# Patient Record
Sex: OTHER/UNKNOWN | Race: WHITE | NOT HISPANIC OR LATINO | Employment: OTHER | URBAN - METROPOLITAN AREA
[De-identification: names, ages, dates, MRNs, and addresses within clinical notes are randomized per-mention and may not be internally consistent; named-entity substitution may affect disease eponyms.]

---

## 2017-08-21 PROBLEM — Z86.011 HISTORY OF MENINGIOMA OF THE BRAIN: Status: ACTIVE | Noted: 2017-08-21

## 2017-08-21 PROBLEM — R27.8 IMPAIRED COORDINATION OF UPPER EXTREMITY: Status: ACTIVE | Noted: 2017-08-21

## 2017-08-21 PROBLEM — Z86.73 HISTORY OF CVA (CEREBROVASCULAR ACCIDENT): Status: ACTIVE | Noted: 2017-08-21

## 2017-08-21 PROBLEM — Q21.9 SEPTAL DEFECT, HEART: Status: ACTIVE | Noted: 2017-08-21

## 2017-08-21 PROBLEM — K21.9 GERD (GASTROESOPHAGEAL REFLUX DISEASE): Status: ACTIVE | Noted: 2017-08-21

## 2017-08-21 PROBLEM — E03.9 HYPOTHYROIDISM: Status: ACTIVE | Noted: 2017-08-21

## 2018-05-14 PROBLEM — G89.29 CHRONIC BILATERAL LOW BACK PAIN WITHOUT SCIATICA: Status: ACTIVE | Noted: 2018-05-14

## 2018-05-14 PROBLEM — M54.50 CHRONIC BILATERAL LOW BACK PAIN WITHOUT SCIATICA: Status: ACTIVE | Noted: 2018-05-14

## 2018-07-27 PROBLEM — F41.8 OTHER SPECIFIED ANXIETY DISORDERS: Status: ACTIVE | Noted: 2018-07-27

## 2019-02-18 PROBLEM — F32.A DEPRESSION: Status: ACTIVE | Noted: 2019-02-18

## 2019-02-18 PROBLEM — Z00.00 MEDICARE ANNUAL WELLNESS VISIT, SUBSEQUENT: Status: ACTIVE | Noted: 2019-02-18

## 2019-05-15 PROBLEM — G47.09 OTHER INSOMNIA: Status: ACTIVE | Noted: 2019-05-15

## 2019-11-04 PROBLEM — I10 ESSENTIAL HYPERTENSION: Status: ACTIVE | Noted: 2019-11-04

## 2020-03-06 PROBLEM — D32.9 BENIGN NEOPLASM OF MENINGES (HCC): Status: ACTIVE | Noted: 2020-03-06

## 2020-03-06 PROBLEM — E55.9 VITAMIN D DEFICIENCY: Status: ACTIVE | Noted: 2020-03-06

## 2020-03-06 PROBLEM — I77.9 DISORDER OF ARTERY OR ARTERIOLE (HCC): Status: ACTIVE | Noted: 2020-03-06

## 2020-03-08 PROBLEM — R32 URINARY INCONTINENCE: Status: ACTIVE | Noted: 2020-03-08

## 2020-03-08 PROBLEM — F41.8 DEPRESSION WITH ANXIETY: Status: ACTIVE | Noted: 2019-02-18

## 2020-03-08 PROBLEM — H54.7 VISION IMPAIRMENT: Status: ACTIVE | Noted: 2020-03-08

## 2020-03-27 PROBLEM — K21.9 GERD (GASTROESOPHAGEAL REFLUX DISEASE): Chronic | Status: ACTIVE | Noted: 2017-08-21

## 2020-03-27 PROBLEM — F41.8 DEPRESSION WITH ANXIETY: Chronic | Status: ACTIVE | Noted: 2019-02-18

## 2020-04-24 PROBLEM — L65.9 HAIR LOSS: Status: ACTIVE | Noted: 2020-04-24

## 2020-05-31 PROBLEM — I95.1 ORTHOSTATIC HYPOTENSION: Status: ACTIVE | Noted: 2020-05-31

## 2020-08-04 PROBLEM — Z87.42 HISTORY OF POSTMENOPAUSAL BLEEDING: Status: ACTIVE | Noted: 2020-08-04

## 2020-08-04 PROBLEM — Z12.39 SCREENING FOR BREAST CANCER: Status: ACTIVE | Noted: 2020-08-04

## 2020-08-04 PROBLEM — Z13.820 SCREENING FOR OSTEOPOROSIS: Status: ACTIVE | Noted: 2020-08-04

## 2020-08-04 PROBLEM — Z96.0 PRESENCE OF PESSARY: Status: ACTIVE | Noted: 2020-08-04

## 2020-08-04 PROBLEM — L98.9 SKIN LESION: Status: ACTIVE | Noted: 2020-08-04

## 2020-09-04 PROBLEM — H35.30 MACULAR DEGENERATION: Status: ACTIVE | Noted: 2020-09-04

## 2020-09-04 PROBLEM — Z23 IMMUNIZATION DUE: Status: ACTIVE | Noted: 2020-09-04

## 2020-09-17 PROBLEM — Z12.83 SKIN EXAM, SCREENING FOR CANCER: Status: ACTIVE | Noted: 2020-09-17

## 2020-09-17 PROBLEM — M85.80 OSTEOPENIA: Status: ACTIVE | Noted: 2020-09-17

## 2020-09-17 PROBLEM — R27.8 IMPAIRED COORDINATION OF UPPER EXTREMITY: Status: RESOLVED | Noted: 2017-08-21 | Resolved: 2020-09-17

## 2020-11-03 PROBLEM — G47.09 OTHER INSOMNIA: Chronic | Status: ACTIVE | Noted: 2019-05-15

## 2020-11-03 PROBLEM — I77.89 ECTASIA OF ARTERY (HCC): Status: ACTIVE | Noted: 2017-08-03

## 2020-11-03 PROBLEM — F33.1 MAJOR DEPRESSIVE DISORDER, RECURRENT EPISODE, MODERATE (HCC): Chronic | Status: ACTIVE | Noted: 2019-02-18

## 2020-11-03 PROBLEM — F41.1 GAD (GENERALIZED ANXIETY DISORDER): Chronic | Status: ACTIVE | Noted: 2020-11-03

## 2020-11-05 PROBLEM — H91.93 DECREASED HEARING OF BOTH EARS: Status: ACTIVE | Noted: 2020-11-05

## 2021-03-22 PROBLEM — R10.32 LEFT GROIN PAIN: Status: ACTIVE | Noted: 2021-03-22

## 2021-03-26 PROBLEM — M70.62 GREATER TROCHANTERIC BURSITIS OF LEFT HIP: Status: ACTIVE | Noted: 2021-03-26

## 2021-04-23 PROBLEM — Z01.810 PREOPERATIVE CARDIOVASCULAR EXAMINATION: Status: ACTIVE | Noted: 2021-04-23

## 2021-04-23 PROBLEM — Z79.01 CURRENT USE OF LONG TERM ANTICOAGULATION: Status: ACTIVE | Noted: 2021-04-23

## 2021-04-26 PROBLEM — F32.A ANXIETY AND DEPRESSION: Status: ACTIVE | Noted: 2020-11-03

## 2021-04-26 PROBLEM — F41.9 ANXIETY AND DEPRESSION: Status: ACTIVE | Noted: 2020-11-03

## 2021-04-26 PROBLEM — K40.91 UNILATERAL RECURRENT INGUINAL HERNIA WITHOUT OBSTRUCTION OR GANGRENE: Status: ACTIVE | Noted: 2021-04-26

## 2021-05-20 PROBLEM — F40.00 AGORAPHOBIA: Status: ACTIVE | Noted: 2021-05-20
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
## 2021-08-20 PROBLEM — H11.33 CONJUNCTIVAL HEMORRHAGE OF BOTH EYES: Status: ACTIVE | Noted: 2021-08-20

## 2021-09-22 PROBLEM — R26.81 GAIT INSTABILITY: Status: ACTIVE | Noted: 2021-09-22

## 2021-11-02 PROBLEM — E78.49 OTHER HYPERLIPIDEMIA: Status: ACTIVE | Noted: 2021-11-02

## 2022-01-18 PROBLEM — S00.83XA HEMATOMA OF OCCIPITAL SURFACE OF HEAD: Status: ACTIVE | Noted: 2022-01-18

## 2022-01-19 PROBLEM — Z00.00 MEDICARE ANNUAL WELLNESS VISIT, SUBSEQUENT: Status: RESOLVED | Noted: 2019-02-18 | Resolved: 2022-01-19

## 2022-01-19 PROBLEM — Z23 IMMUNIZATION DUE: Status: RESOLVED | Noted: 2020-09-04 | Resolved: 2022-01-19

## 2022-01-19 PROBLEM — Z86.011 HISTORY OF MENINGIOMA OF THE BRAIN: Status: RESOLVED | Noted: 2017-08-21 | Resolved: 2022-01-19

## 2022-01-19 PROBLEM — Z12.83 SKIN EXAM, SCREENING FOR CANCER: Status: RESOLVED | Noted: 2020-09-17 | Resolved: 2022-01-19

## 2022-01-19 PROBLEM — G47.09 OTHER INSOMNIA: Chronic | Status: RESOLVED | Noted: 2019-05-15 | Resolved: 2022-01-19

## 2022-01-19 PROBLEM — Z12.39 SCREENING FOR BREAST CANCER: Status: RESOLVED | Noted: 2020-08-04 | Resolved: 2022-01-19

## 2022-01-19 PROBLEM — Z01.810 PREOPERATIVE CARDIOVASCULAR EXAMINATION: Status: RESOLVED | Noted: 2021-04-23 | Resolved: 2022-01-19

## 2022-01-19 PROBLEM — L98.9 SKIN LESION: Status: RESOLVED | Noted: 2020-08-04 | Resolved: 2022-01-19

## 2022-01-19 PROBLEM — L65.9 HAIR LOSS: Status: RESOLVED | Noted: 2020-04-24 | Resolved: 2022-01-19

## 2022-01-19 PROBLEM — Z13.820 SCREENING FOR OSTEOPOROSIS: Status: RESOLVED | Noted: 2020-08-04 | Resolved: 2022-01-19

## 2022-01-19 PROBLEM — R10.32 LEFT GROIN PAIN: Status: RESOLVED | Noted: 2021-03-22 | Resolved: 2022-01-19

## 2022-02-01 PROBLEM — R42 DIZZINESS: Status: ACTIVE | Noted: 2022-02-01

## 2022-02-26 PROBLEM — Z00.00 ENCOUNTER FOR SUBSEQUENT ANNUAL WELLNESS VISIT (AWV) IN MEDICARE PATIENT: Status: ACTIVE | Noted: 2021-04-23

## 2022-06-01 PROBLEM — Z12.31 ENCOUNTER FOR SCREENING MAMMOGRAM FOR MALIGNANT NEOPLASM OF BREAST: Status: ACTIVE | Noted: 2022-06-01

## 2022-07-16 PROBLEM — F33.1 MAJOR DEPRESSIVE DISORDER, RECURRENT EPISODE, MODERATE (HCC): Status: ACTIVE | Noted: 2022-07-16

## 2022-07-16 PROBLEM — R89.9 ABNORMAL LABORATORY TEST: Status: ACTIVE | Noted: 2022-07-16

## 2022-07-24 PROBLEM — M25.559 HIP PAIN: Status: ACTIVE | Noted: 2022-07-24

## 2022-07-24 PROBLEM — Z12.31 ENCOUNTER FOR SCREENING MAMMOGRAM FOR MALIGNANT NEOPLASM OF BREAST: Status: RESOLVED | Noted: 2022-06-01 | Resolved: 2022-07-24

## 2022-10-11 PROBLEM — Z00.00 ENCOUNTER FOR SUBSEQUENT ANNUAL WELLNESS VISIT (AWV) IN MEDICARE PATIENT: Status: RESOLVED | Noted: 2021-04-23 | Resolved: 2022-10-11

## 2023-04-11 PROBLEM — I60.9 SAH (SUBARACHNOID HEMORRHAGE) (HCC): Status: ACTIVE | Noted: 2023-04-11

## 2023-05-08 PROBLEM — S72.002A DISPLACED FRACTURE OF LEFT FEMORAL NECK (HCC): Status: ACTIVE | Noted: 2023-05-08

## 2023-05-09 PROBLEM — Z79.01 CURRENT USE OF LONG TERM ANTICOAGULATION: Status: RESOLVED | Noted: 2021-04-23 | Resolved: 2023-05-09

## 2023-05-09 PROBLEM — D53.9 MACROCYTIC ANEMIA: Status: ACTIVE | Noted: 2023-05-09

## 2023-05-09 PROBLEM — K40.91 UNILATERAL RECURRENT INGUINAL HERNIA WITHOUT OBSTRUCTION OR GANGRENE: Status: RESOLVED | Noted: 2021-04-26 | Resolved: 2023-05-09

## 2023-05-10 PROBLEM — D72.829 LEUKOCYTOSIS: Status: ACTIVE | Noted: 2023-05-10
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
## 2023-05-11 PROBLEM — E87.1 HYPONATREMIA: Status: ACTIVE | Noted: 2023-05-11

## 2023-05-11 PROBLEM — M81.0 OSTEOPOROSIS: Status: ACTIVE | Noted: 2023-05-11

## 2023-05-15 PROBLEM — D64.9 ANEMIA: Status: ACTIVE | Noted: 2023-05-15

## 2023-05-16 PROBLEM — E87.1 HYPONATREMIA: Status: RESOLVED | Noted: 2023-05-11 | Resolved: 2023-05-16

## 2023-05-17 PROBLEM — D72.829 LEUKOCYTOSIS: Status: RESOLVED | Noted: 2023-05-10 | Resolved: 2023-05-17

## 2023-05-17 PROBLEM — Z91.89 POTENTIAL FOR COGNITIVE IMPAIRMENT: Status: ACTIVE | Noted: 2023-05-17

## 2023-05-17 PROBLEM — Z91.89 AT RISK FOR DELIRIUM: Status: ACTIVE | Noted: 2023-05-17

## 2023-05-17 PROBLEM — Z91.89 AT RISK FOR VENOUS THROMBOEMBOLISM (VTE): Status: ACTIVE | Noted: 2023-05-17

## 2023-05-17 PROBLEM — R52 PAIN: Status: ACTIVE | Noted: 2023-05-17

## 2023-05-18 PROBLEM — Z86.79 HISTORY OF SUBARACHNOID HEMORRHAGE: Status: ACTIVE | Noted: 2023-05-18

## 2023-05-22 PROBLEM — E44.1 MILD PROTEIN-CALORIE MALNUTRITION (HCC): Status: ACTIVE | Noted: 2023-05-22

## 2023-05-24 PROBLEM — R41.89 COGNITIVE IMPAIRMENT: Status: ACTIVE | Noted: 2023-05-17

## 2023-05-25 PROBLEM — F32.A DEPRESSION: Status: ACTIVE | Noted: 2023-05-25

## 2023-07-15 PROBLEM — R41.0 EPISODIC CONFUSION: Status: ACTIVE | Noted: 2023-07-15

## 2023-07-16 PROBLEM — I63.9 STROKE (CEREBRUM) (HCC): Status: ACTIVE | Noted: 2023-07-16

## 2024-08-26 ENCOUNTER — HOSPITAL ENCOUNTER (INPATIENT)
Facility: HOSPITAL | Age: 89
LOS: 3 days | Discharge: HOSPICE | DRG: 964 | End: 2024-08-29
Attending: SURGERY | Admitting: SURGERY
Payer: MEDICARE

## 2024-08-26 ENCOUNTER — APPOINTMENT (EMERGENCY)
Dept: RADIOLOGY | Facility: HOSPITAL | Age: 89
DRG: 964 | End: 2024-08-26
Payer: MEDICARE

## 2024-08-26 DIAGNOSIS — S06.369A TRAUMATIC INTRACEREBRAL HEMORRHAGE WITH LOSS OF CONSCIOUSNESS, UNSPECIFIED LATERALITY, INITIAL ENCOUNTER (HCC): ICD-10-CM

## 2024-08-26 DIAGNOSIS — S61.419A LACERATION OF DORSUM OF HAND: ICD-10-CM

## 2024-08-26 DIAGNOSIS — I61.9 BRAIN BLEED (HCC): Primary | ICD-10-CM

## 2024-08-26 LAB
ABO GROUP BLD: NORMAL
ABO GROUP BLD: NORMAL
ALBUMIN SERPL BCG-MCNC: 3.7 G/DL (ref 3.5–5)
ALP SERPL-CCNC: 57 U/L (ref 34–104)
ALT SERPL W P-5'-P-CCNC: 28 U/L (ref 7–52)
ANION GAP SERPL CALCULATED.3IONS-SCNC: 10 MMOL/L (ref 4–13)
ANION GAP SERPL CALCULATED.3IONS-SCNC: 7 MMOL/L (ref 4–13)
APTT PPP: 28 SECONDS (ref 23–34)
AST SERPL W P-5'-P-CCNC: 65 U/L (ref 5–45)
ATRIAL RATE: 101 BPM
BASE EXCESS BLDA CALC-SCNC: 2 MMOL/L (ref -2–3)
BASOPHILS # BLD AUTO: 0.01 THOUSANDS/ÂΜL (ref 0–0.1)
BASOPHILS NFR BLD AUTO: 0 % (ref 0–1)
BILIRUB SERPL-MCNC: 1.5 MG/DL (ref 0.2–1)
BLD GP AB SCN SERPL QL: NEGATIVE
BUN SERPL-MCNC: 23 MG/DL (ref 5–25)
BUN SERPL-MCNC: 25 MG/DL (ref 5–25)
CA-I BLD-SCNC: 1.11 MMOL/L (ref 1.12–1.32)
CALCIUM SERPL-MCNC: 8.3 MG/DL (ref 8.4–10.2)
CALCIUM SERPL-MCNC: 9 MG/DL (ref 8.4–10.2)
CFFMA (FUNCTIONAL FIBRINOGEN MAX AMPLITUDE): 20.3 MM (ref 15–32)
CHLORIDE SERPL-SCNC: 103 MMOL/L (ref 96–108)
CHLORIDE SERPL-SCNC: 104 MMOL/L (ref 96–108)
CK SERPL-CCNC: 1267 U/L (ref 39–192)
CK SERPL-CCNC: 1338 U/L (ref 39–192)
CK SERPL-CCNC: 1438 U/L (ref 39–192)
CKLY30: 1.5 % (ref 0–2.6)
CKR(REACTION TIME): 5.1 MIN (ref 4.6–9.1)
CO2 SERPL-SCNC: 25 MMOL/L (ref 21–32)
CO2 SERPL-SCNC: 26 MMOL/L (ref 21–32)
CREAT SERPL-MCNC: 0.55 MG/DL (ref 0.6–1.3)
CREAT SERPL-MCNC: 0.59 MG/DL (ref 0.6–1.3)
CRTMA(RAPIDTEG MAX AMPLITUDE): 60.9 MM (ref 52–70)
EOSINOPHIL # BLD AUTO: 0.01 THOUSAND/ÂΜL (ref 0–0.61)
EOSINOPHIL NFR BLD AUTO: 0 % (ref 0–6)
ERYTHROCYTE [DISTWIDTH] IN BLOOD BY AUTOMATED COUNT: 13.7 % (ref 11.6–15.1)
GLUCOSE SERPL-MCNC: 117 MG/DL (ref 65–140)
GLUCOSE SERPL-MCNC: 125 MG/DL (ref 65–140)
GLUCOSE SERPL-MCNC: 132 MG/DL (ref 65–140)
HCO3 BLDA-SCNC: 24 MMOL/L (ref 24–30)
HCT VFR BLD AUTO: 27.7 % (ref 36.5–46.1)
HCT VFR BLD CALC: 36 % (ref 36.5–46.1)
HGB BLD-MCNC: 9.3 G/DL (ref 12–15.4)
HGB BLDA-MCNC: 12.2 G/DL (ref 12–15.4)
IMM GRANULOCYTES # BLD AUTO: 0.08 THOUSAND/UL (ref 0–0.2)
IMM GRANULOCYTES NFR BLD AUTO: 1 % (ref 0–2)
INR PPP: 1.19 (ref 0.85–1.19)
LYMPHOCYTES # BLD AUTO: 1.41 THOUSANDS/ÂΜL (ref 0.6–4.47)
LYMPHOCYTES NFR BLD AUTO: 8 % (ref 14–44)
MCH RBC QN AUTO: 33.3 PG (ref 26.8–34.3)
MCHC RBC AUTO-ENTMCNC: 33.6 G/DL (ref 31.4–37.4)
MCV RBC AUTO: 99 FL (ref 82–98)
MONOCYTES # BLD AUTO: 1.29 THOUSAND/ÂΜL (ref 0.17–1.22)
MONOCYTES NFR BLD AUTO: 8 % (ref 4–12)
NEUTROPHILS # BLD AUTO: 14.07 THOUSANDS/ÂΜL (ref 1.85–7.62)
NEUTS SEG NFR BLD AUTO: 83 % (ref 43–75)
NRBC BLD AUTO-RTO: 0 /100 WBCS
PCO2 BLD: 25 MMOL/L (ref 21–32)
PCO2 BLD: 28.5 MM HG (ref 42–50)
PH BLD: 7.54 [PH] (ref 7.3–7.4)
PLATELET # BLD AUTO: 175 THOUSANDS/UL (ref 149–390)
PMV BLD AUTO: 9.5 FL (ref 8.9–12.7)
PO2 BLD: 24 MM HG (ref 35–45)
POTASSIUM BLD-SCNC: 3.9 MMOL/L (ref 3.5–5.3)
POTASSIUM SERPL-SCNC: 3.7 MMOL/L (ref 3.5–5.3)
POTASSIUM SERPL-SCNC: 3.9 MMOL/L (ref 3.5–5.3)
PR INTERVAL: 112 MS
PROT SERPL-MCNC: 5.5 G/DL (ref 6.4–8.4)
PROTHROMBIN TIME: 15.4 SECONDS (ref 12.3–15)
QRS AXIS: 3 DEGREES
QRSD INTERVAL: 84 MS
QT INTERVAL: 388 MS
QTC INTERVAL: 503 MS
RBC # BLD AUTO: 2.79 MILLION/UL (ref 3.88–5.12)
RH BLD: POSITIVE
RH BLD: POSITIVE
SAO2 % BLD FROM PO2: 53 % (ref 60–85)
SODIUM BLD-SCNC: 138 MMOL/L (ref 136–145)
SODIUM SERPL-SCNC: 135 MMOL/L (ref 135–147)
SODIUM SERPL-SCNC: 140 MMOL/L (ref 135–147)
SPECIMEN EXPIRATION DATE: NORMAL
SPECIMEN SOURCE: ABNORMAL
T WAVE AXIS: 73 DEGREES
VENTRICULAR RATE: 101 BPM
WBC # BLD AUTO: 16.87 THOUSAND/UL (ref 4.31–10.16)

## 2024-08-26 PROCEDURE — 90471 IMMUNIZATION ADMIN: CPT

## 2024-08-26 PROCEDURE — 99291 CRITICAL CARE FIRST HOUR: CPT | Performed by: EMERGENCY MEDICINE

## 2024-08-26 PROCEDURE — 70496 CT ANGIOGRAPHY HEAD: CPT

## 2024-08-26 PROCEDURE — 82550 ASSAY OF CK (CPK): CPT

## 2024-08-26 PROCEDURE — 72125 CT NECK SPINE W/O DYE: CPT

## 2024-08-26 PROCEDURE — 71260 CT THORAX DX C+: CPT

## 2024-08-26 PROCEDURE — 80053 COMPREHEN METABOLIC PANEL: CPT | Performed by: SURGERY

## 2024-08-26 PROCEDURE — 90715 TDAP VACCINE 7 YRS/> IM: CPT

## 2024-08-26 PROCEDURE — 85576 BLOOD PLATELET AGGREGATION: CPT

## 2024-08-26 PROCEDURE — 85347 COAGULATION TIME ACTIVATED: CPT

## 2024-08-26 PROCEDURE — 99223 1ST HOSP IP/OBS HIGH 75: CPT | Performed by: NURSE PRACTITIONER

## 2024-08-26 PROCEDURE — 85014 HEMATOCRIT: CPT

## 2024-08-26 PROCEDURE — 84132 ASSAY OF SERUM POTASSIUM: CPT

## 2024-08-26 PROCEDURE — 86850 RBC ANTIBODY SCREEN: CPT | Performed by: SURGERY

## 2024-08-26 PROCEDURE — 93005 ELECTROCARDIOGRAM TRACING: CPT

## 2024-08-26 PROCEDURE — 82330 ASSAY OF CALCIUM: CPT

## 2024-08-26 PROCEDURE — 70486 CT MAXILLOFACIAL W/O DYE: CPT

## 2024-08-26 PROCEDURE — 71045 X-RAY EXAM CHEST 1 VIEW: CPT

## 2024-08-26 PROCEDURE — 1124F ACP DISCUSS-NO DSCNMKR DOCD: CPT | Performed by: EMERGENCY MEDICINE

## 2024-08-26 PROCEDURE — 85397 CLOTTING FUNCT ACTIVITY: CPT

## 2024-08-26 PROCEDURE — 86901 BLOOD TYPING SEROLOGIC RH(D): CPT | Performed by: SURGERY

## 2024-08-26 PROCEDURE — 70498 CT ANGIOGRAPHY NECK: CPT

## 2024-08-26 PROCEDURE — 12042 INTMD RPR N-HF/GENIT2.6-7.5: CPT | Performed by: EMERGENCY MEDICINE

## 2024-08-26 PROCEDURE — 85610 PROTHROMBIN TIME: CPT | Performed by: SURGERY

## 2024-08-26 PROCEDURE — 76705 ECHO EXAM OF ABDOMEN: CPT | Performed by: EMERGENCY MEDICINE

## 2024-08-26 PROCEDURE — 85730 THROMBOPLASTIN TIME PARTIAL: CPT | Performed by: SURGERY

## 2024-08-26 PROCEDURE — 70450 CT HEAD/BRAIN W/O DYE: CPT

## 2024-08-26 PROCEDURE — 93010 ELECTROCARDIOGRAM REPORT: CPT | Performed by: INTERNAL MEDICINE

## 2024-08-26 PROCEDURE — 99285 EMERGENCY DEPT VISIT HI MDM: CPT

## 2024-08-26 PROCEDURE — 82550 ASSAY OF CK (CPK): CPT | Performed by: SURGERY

## 2024-08-26 PROCEDURE — 86900 BLOOD TYPING SEROLOGIC ABO: CPT | Performed by: SURGERY

## 2024-08-26 PROCEDURE — 84295 ASSAY OF SERUM SODIUM: CPT

## 2024-08-26 PROCEDURE — 0HQGXZZ REPAIR LEFT HAND SKIN, EXTERNAL APPROACH: ICD-10-PCS | Performed by: EMERGENCY MEDICINE

## 2024-08-26 PROCEDURE — 80048 BASIC METABOLIC PNL TOTAL CA: CPT

## 2024-08-26 PROCEDURE — 82803 BLOOD GASES ANY COMBINATION: CPT

## 2024-08-26 PROCEDURE — 36415 COLL VENOUS BLD VENIPUNCTURE: CPT | Performed by: SURGERY

## 2024-08-26 PROCEDURE — EDAIR PR ED AIR: Performed by: EMERGENCY MEDICINE

## 2024-08-26 PROCEDURE — 85384 FIBRINOGEN ACTIVITY: CPT

## 2024-08-26 PROCEDURE — 74177 CT ABD & PELVIS W/CONTRAST: CPT

## 2024-08-26 PROCEDURE — 85025 COMPLETE CBC W/AUTO DIFF WBC: CPT | Performed by: SURGERY

## 2024-08-26 PROCEDURE — 99223 1ST HOSP IP/OBS HIGH 75: CPT | Performed by: PHYSICIAN ASSISTANT

## 2024-08-26 PROCEDURE — 99223 1ST HOSP IP/OBS HIGH 75: CPT | Performed by: SURGERY

## 2024-08-26 PROCEDURE — 82947 ASSAY GLUCOSE BLOOD QUANT: CPT

## 2024-08-26 PROCEDURE — 93308 TTE F-UP OR LMTD: CPT | Performed by: EMERGENCY MEDICINE

## 2024-08-26 PROCEDURE — NC001 PR NO CHARGE: Performed by: EMERGENCY MEDICINE

## 2024-08-26 RX ORDER — SODIUM CHLORIDE, SODIUM GLUCONATE, SODIUM ACETATE, POTASSIUM CHLORIDE, MAGNESIUM CHLORIDE, SODIUM PHOSPHATE, DIBASIC, AND POTASSIUM PHOSPHATE .53; .5; .37; .037; .03; .012; .00082 G/100ML; G/100ML; G/100ML; G/100ML; G/100ML; G/100ML; G/100ML
500 INJECTION, SOLUTION INTRAVENOUS ONCE
Status: COMPLETED | OUTPATIENT
Start: 2024-08-26 | End: 2024-08-26

## 2024-08-26 RX ORDER — MIRTAZAPINE 7.5 MG/1
7.5 TABLET, FILM COATED ORAL
COMMUNITY
End: 2024-08-29

## 2024-08-26 RX ORDER — LEVOTHYROXINE SODIUM 137 UG/1
150 TABLET ORAL DAILY
Status: ON HOLD | COMMUNITY
End: 2024-08-26

## 2024-08-26 RX ORDER — ATORVASTATIN CALCIUM 40 MG/1
40 TABLET, FILM COATED ORAL DAILY
Status: DISCONTINUED | OUTPATIENT
Start: 2024-08-26 | End: 2024-08-27

## 2024-08-26 RX ORDER — MIDODRINE HYDROCHLORIDE 5 MG/1
5 TABLET ORAL
COMMUNITY
End: 2024-08-29

## 2024-08-26 RX ORDER — CHLORHEXIDINE GLUCONATE ORAL RINSE 1.2 MG/ML
15 SOLUTION DENTAL EVERY 12 HOURS SCHEDULED
Status: DISCONTINUED | OUTPATIENT
Start: 2024-08-26 | End: 2024-08-27

## 2024-08-26 RX ORDER — LIDOCAINE HYDROCHLORIDE 10 MG/ML
10 INJECTION, SOLUTION EPIDURAL; INFILTRATION; INTRACAUDAL; PERINEURAL ONCE
Status: COMPLETED | OUTPATIENT
Start: 2024-08-26 | End: 2024-08-26

## 2024-08-26 RX ORDER — LEVOTHYROXINE SODIUM 150 UG/1
150 TABLET ORAL DAILY
COMMUNITY
End: 2024-08-29

## 2024-08-26 RX ORDER — SERTRALINE HYDROCHLORIDE 100 MG/1
100 TABLET, FILM COATED ORAL DAILY
Status: DISCONTINUED | OUTPATIENT
Start: 2024-08-26 | End: 2024-08-27

## 2024-08-26 RX ORDER — GINSENG 100 MG
1 CAPSULE ORAL ONCE
Status: COMPLETED | OUTPATIENT
Start: 2024-08-26 | End: 2024-08-26

## 2024-08-26 RX ORDER — SODIUM CHLORIDE, SODIUM GLUCONATE, SODIUM ACETATE, POTASSIUM CHLORIDE, MAGNESIUM CHLORIDE, SODIUM PHOSPHATE, DIBASIC, AND POTASSIUM PHOSPHATE .53; .5; .37; .037; .03; .012; .00082 G/100ML; G/100ML; G/100ML; G/100ML; G/100ML; G/100ML; G/100ML
125 INJECTION, SOLUTION INTRAVENOUS CONTINUOUS
Status: DISCONTINUED | OUTPATIENT
Start: 2024-08-26 | End: 2024-08-27

## 2024-08-26 RX ORDER — SERTRALINE HYDROCHLORIDE 100 MG/1
100 TABLET, FILM COATED ORAL DAILY
COMMUNITY
End: 2024-08-29

## 2024-08-26 RX ORDER — LEVETIRACETAM 500 MG/5ML
500 INJECTION, SOLUTION, CONCENTRATE INTRAVENOUS EVERY 12 HOURS SCHEDULED
Status: DISCONTINUED | OUTPATIENT
Start: 2024-08-26 | End: 2024-08-27

## 2024-08-26 RX ORDER — ATORVASTATIN CALCIUM 40 MG/1
40 TABLET, FILM COATED ORAL DAILY
COMMUNITY
End: 2024-08-29

## 2024-08-26 RX ADMIN — TETANUS TOXOID, REDUCED DIPHTHERIA TOXOID AND ACELLULAR PERTUSSIS VACCINE, ADSORBED 0.5 ML: 5; 2.5; 8; 8; 2.5 SUSPENSION INTRAMUSCULAR at 12:27

## 2024-08-26 RX ADMIN — IOHEXOL 100 ML: 350 INJECTION, SOLUTION INTRAVENOUS at 12:58

## 2024-08-26 RX ADMIN — SODIUM CHLORIDE, SODIUM GLUCONATE, SODIUM ACETATE, POTASSIUM CHLORIDE, MAGNESIUM CHLORIDE, SODIUM PHOSPHATE, DIBASIC, AND POTASSIUM PHOSPHATE 75 ML/HR: .53; .5; .37; .037; .03; .012; .00082 INJECTION, SOLUTION INTRAVENOUS at 15:30

## 2024-08-26 RX ADMIN — SODIUM CHLORIDE, SODIUM GLUCONATE, SODIUM ACETATE, POTASSIUM CHLORIDE, MAGNESIUM CHLORIDE, SODIUM PHOSPHATE, DIBASIC, AND POTASSIUM PHOSPHATE 500 ML: .53; .5; .37; .037; .03; .012; .00082 INJECTION, SOLUTION INTRAVENOUS at 15:13

## 2024-08-26 RX ADMIN — LIDOCAINE HYDROCHLORIDE 10 ML: 10 INJECTION, SOLUTION EPIDURAL; INFILTRATION; INTRACAUDAL; PERINEURAL at 13:37

## 2024-08-26 RX ADMIN — ATORVASTATIN CALCIUM 40 MG: 40 TABLET, FILM COATED ORAL at 16:17

## 2024-08-26 RX ADMIN — BACITRACIN 1 SMALL APPLICATION: 500 OINTMENT TOPICAL at 16:18

## 2024-08-26 RX ADMIN — Medication 2000 UNITS: at 13:23

## 2024-08-26 RX ADMIN — LEVETIRACETAM 500 MG: 100 INJECTION, SOLUTION INTRAVENOUS at 21:10

## 2024-08-26 RX ADMIN — LEVETIRACETAM 500 MG: 100 INJECTION, SOLUTION INTRAVENOUS at 13:19

## 2024-08-26 RX ADMIN — SODIUM CHLORIDE, SODIUM GLUCONATE, SODIUM ACETATE, POTASSIUM CHLORIDE, MAGNESIUM CHLORIDE, SODIUM PHOSPHATE, DIBASIC, AND POTASSIUM PHOSPHATE 500 ML: .53; .5; .37; .037; .03; .012; .00082 INJECTION, SOLUTION INTRAVENOUS at 18:00

## 2024-08-26 RX ADMIN — SERTRALINE 100 MG: 50 TABLET, FILM COATED ORAL at 16:17

## 2024-08-26 RX ADMIN — CHLORHEXIDINE GLUCONATE 0.12% ORAL RINSE 15 ML: 1.2 LIQUID ORAL at 21:10

## 2024-08-26 NOTE — H&P
H&P - Trauma   Rozina Pizano 89 y.o. adult MRN: [id number]  Unit/Bed#: CRB Encounter: [id number]    Trauma Alert: {Trauma Alert:22120}   Model of Arrival: {Mode of Arrival:22121}    Trauma Team: {Team:13573}  Consultants: {[facility] Trauma Consultants:25230}     Assessment & Plan   Active Problems / Assessment:   ***     Plan:   ***    History of Present Illness     Chief Complaint: ***  Mechanism:{Mechanism of injury:84529}     HPI:    Rozina Pizano is a 89 y.o. adult who presents with ***.    Review of Systems  *** (fill out SmartBlock)  12-point, complete review of systems was reviewed and negative except as stated above.     Historical Information     No past medical history on file.  No past surgical history on file.   Unable to obtain history due to {Reasons why history is unobtainable:57465}       Immunization History   Administered Date(s) Administered    Tdap 08/26/2024     Last Tetanus: today  Family History: Non-contributory    1. Before the illness or injury that brought you to the Emergency, did you need someone to help you on a regular basis? {Yes=1/No=0:55645}   2. Since the illness or injury that brought you to the Emergency, have you needed more help than usual to take care of yourself? {Yes=1/No=0:94103}   3. Have you been hospitalized for one or more nights during the past 6 months (excluding a stay in the Emergency Department)? {Yes=1/No=0:59915}   4. In general, do you see well? {Yes=0/No=1:06619}   5. In general, do you have serious problems with your memory? {Yes=1/No=0:71910}   6. Do you take more than three different medications everyday? {Yes=1/No=0:09416}   TOTAL   {Numbers; 0-6:55720}     Did you order a geriatric consult if the score was 2 or greater?: {Yes/No-Ex:511385}     Meds/Allergies   {[facility] MEDS:264592875}  Allergies have not been reviewed;  Not on File    Objective   Initial Vitals:   Temperature: 98.3 °F (36.8 °C) (08/26/24 1228)  Pulse: 88 (08/26/24  1228)  Respirations: 18 (08/26/24 1228)  Blood Pressure: 131/73 (08/26/24 1228)    Primary Survey *** (Fill out SmartBlock)  Secondary Survey:  Physical Exam *** (fill out SmartBlock)    Invasive Devices       Peripheral Intravenous Line  Duration             Peripheral IV 08/26/24 Left Antecubital <1 day    Peripheral IV 08/26/24 Left;Ventral (anterior) Forearm <1 day                  Lab Results: I have personally reviewed all pertinent laboratory/test results 08/26/24 and in the preceding 24 hours.  Recent Labs     08/26/24  1234   HGB 12.2   HCT 36*   CO2 25   CAIONIZED 1.11*       Imaging Results: I have personally reviewed pertinent images saved in PACS. CT scan findings (and other pertinent positive findings on images) were discussed with radiology. My interpretation of the images/reports are as follows:  Chest Xray(s): {Results:78017}   FAST exam(s): {Results:92364}   CT Scan(s): {Results:72079}   Additional Xray(s): {Results:15632}     Other Studies: ***    Code Status: Level 1 - Full Code  Advance Directive and Living Will:      Power of :    POLST:

## 2024-08-26 NOTE — ED PROVIDER NOTES
"Emergency Department Airway Evaluation and Management Form    History  Obtained from: EMS  Patient has no allergy information on record.  Chief Complaint   Patient presents with    Trauma     Level B found supine @ home by neighbors. Takes xarelto      89 yo female on an oral anticoagulant (Xarelto) brought to the ED by EMS as a level B trauma. The patient was found down at home with obvious signs of facial trauma just prior to arrival. (+) Some increased confusion per report. Last known \"normal\" was yesterday around 1700.        Past Medical History:   Diagnosis Date    Hypothyroidism     Macular degeneration      Past Surgical History:   Procedure Laterality Date    HERNIA REPAIR       Family History   Problem Relation Age of Onset    Pancreatic cancer Mother     Heart disease Mother         I have reviewed and agree with the history as documented.    Review of Systems   Unable to perform ROS: Mental status change       Physical Exam  /83 (BP Location: Right arm)   Pulse 90   Temp 98 °F (36.7 °C)   Resp 16   Ht 5' 3\" (1.6 m)   Wt 53.6 kg (118 lb 2.7 oz)   SpO2 97%   BMI 20.93 kg/m²     Physical Exam  Vitals and nursing note reviewed.   Constitutional:       General: Rozina is in acute distress.      Appearance: Rozina is well-developed. Rozina is ill-appearing.   HENT:      Head: Normocephalic. Contusion, right periorbital erythema and left periorbital erythema present.      Mouth/Throat:      Mouth: Mucous membranes are dry.   Eyes:      Conjunctiva/sclera: Conjunctivae normal.   Cardiovascular:      Rate and Rhythm: Normal rate and regular rhythm.      Heart sounds: No murmur heard.  Pulmonary:      Effort: Pulmonary effort is normal. No respiratory distress.      Breath sounds: Normal breath sounds.   Abdominal:      Palpations: Abdomen is soft.      Tenderness: There is no abdominal tenderness.   Musculoskeletal:         General: No swelling.      Cervical back: Neck supple.   Skin:     " General: Skin is warm and dry.      Capillary Refill: Capillary refill takes less than 2 seconds.      Coloration: Skin is pale.      Findings: Abrasion, bruising and signs of injury present.   Neurological:      Mental Status: Rozina is alert.   Psychiatric:         Mood and Affect: Mood normal.         ED Medications  Medications   levETIRAcetam (KEPPRA) injection 500 mg (500 mg Intravenous Given 8/26/24 1319)   bacitracin topical ointment 1 small application (has no administration in time range)   chlorhexidine (PERIDEX) 0.12 % oral rinse 15 mL (has no administration in time range)   multi-electrolyte (PLASMALYTE-A/ISOLYTE-S PH 7.4) IV solution (has no administration in time range)   levothyroxine tablet 137 mcg (has no administration in time range)   atorvastatin (LIPITOR) tablet 40 mg (has no administration in time range)   sertraline (ZOLOFT) tablet 100 mg (has no administration in time range)   sertraline (ZOLOFT) tablet 50 mg (has no administration in time range)   multi-electrolyte (ISOLYTE-S PH 7.4) bolus 500 mL (has no administration in time range)   tetanus-diphtheria-acellular pertussis (BOOSTRIX) IM injection 0.5 mL (0.5 mL Intramuscular Given 8/26/24 1227)   iohexol (OMNIPAQUE) 350 MG/ML injection (MULTI-DOSE) 100 mL (100 mL Intravenous Given 8/26/24 1258)   prothrombin complex concentrate (human) (Kcentra) 2,000 Units (2,000 Units Intravenous Given 8/26/24 1323)   lidocaine (PF) (XYLOCAINE-MPF) 1 % injection 10 mL (10 mL Infiltration Given by Other 8/26/24 1337)       Intubation  Procedures    Notes  The patient is confused and ill appearing with obvious external signs of facial/head trauma. (+) Blood thinner. She is protecting her airway at present --> emergent endotracheal intubation not indicated at this time. Remainder of care deferred to the Trauma Team.    Final Diagnosis  Final diagnoses:   Brain bleed (HCC)       ED Provider  Electronically Signed by     Balbir Blanchard MD  08/26/24  1644

## 2024-08-26 NOTE — CASE MANAGEMENT
Case Management Assessment & Discharge Planning Note    Patient name Rozina Pizano  Location ICU 11/ICU 11 MRN [id number]  : 1934 Date 2024       Current Admission Date: 2024  Current Admission Diagnosis:ICH (intracerebral hemorrhage) (HCC)  Patient Active Problem List    Diagnosis Date Noted Date Diagnosed    ICH (intracerebral hemorrhage) (HCC) 2024       LOS (days): 0  Geometric Mean LOS (GMLOS) (days):   Days to GMLOS:     OBJECTIVE:              Current admission status: Inpatient       Preferred Pharmacy: No Pharmacies Listed  Primary Care Provider: Sarah Arnold MD    Primary Insurance: MEDICARE  Secondary Insurance: [employer]    ASSESSMENT:  Active Health Care Proxies    There are no active Health Care Proxies on file.       Advance Directives  Primary Contact: Francheska Pizano (Daughter) [phone]    Readmission Root Cause  30 Day Readmission: No    Patient Information  Admitted from:: Home  Mental Status: Alert  During Assessment patient was accompanied by: Not accompanied during assessment  Assessment information provided by:: Daughter  Primary Caregiver: Self  Support Systems: Self, Son, Home care staff  County of Residence: [place]  What city do you live in?: [place]  Home entry access options. Select all that apply.: Stairs  Number of steps to enter home.: 2  Do the steps have railings?: No  Type of Current Residence: [address] [place] [address] home  Upon entering residence, is there a bedroom on the main floor (no further steps)?: Yes  Upon entering residence, is there a bathroom on the main floor (no further steps)?: Yes  Living Arrangements: Lives Alone  Is patient a ?: No    Activities of Daily Living Prior to Admission  Functional Status: Independent  Completes ADLs independently?: Yes  Ambulates independently?: Yes  Does patient use assisted devices?: Yes  Assisted Devices (DME) used: Straight Cane, Walker, Bedside Commode  Does patient currently own DME?:  Yes  What DME does the patient currently own?: Bedside Commode, Straight Cane, Walker  Does patient have a history of Outpatient Therapy (PT/OT)?: Yes  Does the patient have a history of Short-Term Rehab?: No  Does patient have a history of HHC?: Yes    Patient Information Continued  Income Source: Pension/nursing home  Does patient have prescription coverage?: Yes  Does patient receive dialysis treatments?: No  Does patient have a history of substance abuse?: No  Does patient have a history of Mental Health Diagnosis?: No    Means of Transportation  Means of Transport to [facility]:: Family transport    Social Determinants of Health (SDOH)      Flowsheet Row Most Recent Value   Housing Stability    In the last 12 months, was there a time when you were not able to pay the mortgage or rent on time? N   In the past 12 months, how many times have you moved where you were living? 0   At any time in the past 12 months, were you homeless or living in a shelter (including now)? N   Transportation Needs    In the past 12 months, has lack of transportation kept you from medical appointments or from getting medications? no   In the past 12 months, has lack of transportation kept you from meetings, work, or from getting things needed for daily living? No   Food Insecurity    Within the past 12 months, you worried that your food would run out before you got the money to buy more. Never true   Within the past 12 months, the food you bought just didn't last and you didn't have money to get more. Never true   Utilities    In the past 12 months has the electric, gas, oil, or water company threatened to shut off services in your home? No          DISCHARGE DETAILS:    Discharge planning discussed with:: Francheska Pizano (Daughter) [phone]  Freedom of Choice: Yes     CM contacted family/caregiver?: Yes  Were Treatment Team discharge recommendations reviewed with patient/caregiver?: Yes  Did patient/caregiver verbalize understanding of  patient care needs?: N/A- going to facility  Were patient/caregiver advised of the risks associated with not following Treatment Team discharge recommendations?: Yes    Contacts  Patient Contacts: Francheska Pizano (Daughter) [phone]  Relationship to Patient:: Family  Contact Method: Phone  Phone Number: [phone]  Reason/Outcome: Continuity of Care, Emergency Contact, Discharge Planning    Requested Home Health Care         Is the patient interested in HHC at discharge?: No    DME Referral Provided  Referral made for DME?: No    CM spoke to pt's dtr, Francheska, to discuss the role of CM.  Pt lives alone but has a private caregiver who comes 2-3x week to assist with bathing and work around the house; as well as a cleaning person once a week  Pt lives in a 2 story home which has 2STE but pt remains on the 1st floor. Pt has a 1st floor bedroom and bathroom (walk-in shower with grab bars and raised toilet).  Pt doesn't drive. Pt is retired. Pt is independent with ADL/iADLs but will have some assistance for bathing if needed.   Pt owns a walker and SPC and will use them to ambulate. Pt also has a shower chair. Pt's had 3-4 falls.       CM reviewed d/c planning process including the following: identifying help at home, patient preference for d/c planning needs, Discharge Lounge, [facility] Meds to Bed program, availability of treatment team to discuss questions or concerns patient and/or family may have regarding understanding medications and recognizing signs and symptoms once discharged.  CM also encouraged patient to follow up with all recommended appointments after discharge. Patient advised of importance for patient and family to participate in managing patient’s medical well being.

## 2024-08-26 NOTE — QUICK NOTE
The patient had a CT scan of the cervical spine demonstrating no acute fractures or traumatic malalignment. On exam, the patient had no midline cervical spine tenderness. The patient had full range of motion (was then able to flex, extend, and rotate head side to side) without pain. There were no distracting injuries and the patient was not intoxicated.      The patients cervical collar was cleared radiologically and clinically.    Jhonatan Flowers  8/26/2024  3:17 PM

## 2024-08-26 NOTE — CONSULTS
[facility]  Consult: Critical Care  Name: Rozina Pizano 89 y.o. adult I MRN: [id number]  Unit/Bed#: ICU 11 I Date of Admission: 8/26/2024   Date of Service: 8/26/2024 I Hospital Day: 0      Consults  Assessment & Plan   Neuro: left cerebellar hematoma with effacement into 4th ventricle; small volume SAH; stable meningioma on the right; early hydrocephalus; prior CVA; high risk for deterioration; bilateral subconjunctival hemorrhage     Plan: Reversed Xarelto with Kcentra in the ED  Keppra 500 mg BID for seizure ppx  Q1h neuro checks, monitor extraocular movements   Repeat CT scan tomorrow morning, scan if change in mental status/GCS  GCS currently 14  Continue home zoloft for depression and anxiety: 100 mg in the morning, 50 mg QHS  BP goal of <160 mmHg systolic   Hold antiplatelet/Ac medications  DVT ppx: SCDs  Stat CT head with any neurologic decline including drop of GCS of 2 points within 1 hour  TEG 6 with Lysis after Kcentra administration     CV:   Diagnosis: HLD, prior CVA  Plan: Continue home lipitor 40 mg daily     Pulm:  Diagnosis: No active problems  Plan: no active interventions    GI:   No active issues    :   Diagnosis: Urinary retention, elevated CK of 1267  Plan: Strict Is & Os   Trend BMP, CK BID   Jose catheter in place  Bolus isolyte 500 cc     F/E/N:   Diagnosis:   Plan: Fluids: isolyte 75 ml/H  Electrolytes: BID BMP, CK  Nutrition: NPO until nursing dysphagia evaluation    Heme/Onc:   Diagnosis: DVT prophylaxis   Plan: Held in the setting of fall and intracranial bleed   SCDs     Endo:   Diagnosis: Hypothyroidism  Plan: Home levothyroxine 137 mcg daily     ID:   No active issues    MSK/Skin:   Diagnosis: Concern for rhabdomyolysis   Plan: CK and BMP BID  Skin care, turning q2h   5 cm left Hand laceration repaired with 5 4-0 prolene sutures, removal in 7 days  Keep hand clean and dry, wash with soap and water after 24 hours     Disposition:  Critical care     History of Present Illness     HPI: Rozina Pizano is a 89 y.o. who presents with fall on Xarelto. Patient has a history of ASD who was previously not on AC and had several embolic events/TIAs, and was placed on Xarelto. Was on AC prior, but taken off due to falls. Has a history of orthostatic hypotension on midodrine 5 mg TID and repeated falls. She has no recollection of the fall, possibly found due to wellness check.     History obtained from chart review.  Review of Systems: See HPI for Review of Systems  Historical Information   Past Medical History:  No date: Hypothyroidism  No date: Macular degeneration Past Surgical History:  No date: HERNIA REPAIR   Current Outpatient Medications   Medication Instructions    atorvastatin (LIPITOR) 40 mg, Oral, Daily    levothyroxine 137 mcg, Oral, Daily    midodrine (PROAMATINE) 5 mg, Oral, 3 times daily before meals    mirtazapine (REMERON) 7.5 mg, Oral, Daily at bedtime    rivaroxaban (XARELTO) 20 mg, Oral, Daily with breakfast    sertraline (ZOLOFT) 100 mg, Oral, Daily    sertraline (ZOLOFT) 50 mg, Oral, Daily at bedtime    Not on File     Family History   Problem Relation Age of Onset    Pancreatic cancer Mother     Heart disease Mother           Objective                            Vitals I/O      Most Recent Min/Max in 24hrs   Temp 98 °F (36.7 °C) Temp  Min: 98 °F (36.7 °C)  Max: 98.3 °F (36.8 °C)   Pulse 90 Pulse  Min: 85  Max: 101   Resp 16 Resp  Min: 16  Max: 22   /83 BP  Min: 131/73  Max: 156/86   O2 Sat 97 % SpO2  Min: 96 %  Max: 98 %    No intake or output data in the 24 hours ending 08/26/24 1532    Diet NPO    Invasive Monitoring           Physical Exam   Physical Exam  Vitals and nursing note reviewed. Exam conducted with a chaperone present.   Eyes:      General: Vision grossly intact.      Extraocular Movements:      Right eye: Normal extraocular motion.      Left eye: Normal extraocular motion.      Conjunctiva/sclera:       Right eye: Hemorrhage present.      Left eye: Hemorrhage present.      Pupils: Pupils are equal, round, and reactive to light.      Comments: Bilateral periorbital edema and bruising present, with subconjunctival hemorrhage present    Skin:     General: Skin is warm and dry.      Capillary Refill: Capillary refill takes less than 2 seconds.      Findings: Bruising (bruising over right shoulder, left arm, right ankle) present.   HENT:      Head:      Comments: Bilateral periorbital edema and bruising; bilateral cheek bruising and swelling, bruising over bilateral upper lips   Cardiovascular:      Rate and Rhythm: Regular rhythm. Tachycardia present.      Pulses: Normal pulses.      Heart sounds: Normal heart sounds.   Musculoskeletal:         General: Swelling (facial swelling, bilateral) present.      Cervical back: No rigidity.      Right lower leg: No edema.      Left lower leg: No edema.   Abdominal: General: Abdomen is flat. There is no distension.      Palpations: Abdomen is soft.      Tenderness: There is no abdominal tenderness.   Constitutional:       Appearance: Rozina is ill-appearing.   Pulmonary:      Effort: No accessory muscle usage, respiratory distress or accessory muscle usage.      Breath sounds: Normal breath sounds.   Chest:      Chest wall: No deformity, tenderness or crepitus.   Psychiatric:         Behavior: Behavior is cooperative.   Neurological:      Mental Status: Rozina is disoriented to time.      GCS: GCS eye subscore is 4. GCS verbal subscore is 4. GCS motor subscore is 6.      Cranial Nerves: Cranial nerves 2-12 are intact.      Sensory: Sensation is intact.      Motor: Weakness (Right upper extremity slightly weaker than left).            Diagnostic Studies      EKG: per my interpretation Rate 101; rhythm sinus tach, normal axis; , ; compared to prior no PVCs seen currently, no acute ST segment or T wave changes  Imaging: CT HEAD WO contrast:   Acute parenchymal  hematoma centered in the left cerebellum measures up to 4 cm with intraventricular extension.. Mild surrounding edema with effacement of the fourth ventricle.   Small volume of subarachnoid hemorrhage.   Temporal horns mildly increased in size suggesting early hydrocephalus   Stable meningioma   CT C spine without contrast:   No acute fracture: Atlantoaxial rotatory subluxation versus positional rotation.   CT Facial Bones without IV contrast:   FACIAL BONES:   No facial bone fracture identified.  Normal alignment of the temporomandibular joints.  No lytic or blastic lesion. Bilateral TMJ osteoarthritis     ORBITS: Bilateral lens replacement. Orbital globes, optic nerves, and extraocular muscles appear symmetric and normal. There is no evidence of retrobulbar mass, abscess, or hematoma.     SINUSES: Minimal paranasal sinus mucosal thickening.     SOFT TISSUES: Bilateral facial soft tissue swelling and subcutaneous hematoma extending to the left frontal scalp.     IMPRESSION:     No acute fracture.  Bilateral facial/scalp soft tissue swelling and subcutaneous hematomas    CT Chest abdomen pelvis w contrast  No acute intrathoracic or intra-abdominal pathology.      I have personally reviewed pertinent reports.   and I have personally reviewed pertinent films in PACS     Medications:  Scheduled PRN   atorvastatin, 40 mg, Daily  bacitracin, 1 small application, Once  chlorhexidine, 15 mL, Q12H WISAM  levETIRAcetam, 500 mg, Q12H WISAM  [START ON 8/27/2024] levothyroxine, 137 mcg, Early Morning  multi-electrolyte, 500 mL, Once  sertraline, 100 mg, Daily  sertraline, 50 mg, HS          Continuous    multi-electrolyte, 75 mL/hr         Labs:    CBC    Recent Labs     08/26/24  1234 08/26/24  1304   WBC  --  16.87*   HGB 12.2 9.3*   HCT 36* 27.7*   PLT  --  175     BMP    Recent Labs     08/26/24  1234 08/26/24  1304   SODIUM  --  135   K  --  3.7   CL  --  103   CO2 25 25   AGAP  --  7   BUN  --  25   CREATININE  --  0.59*    CALCIUM  --  8.3*       Coags    Recent Labs     08/26/24  1304   INR 1.19   PTT 28        Additional Electrolytes  Recent Labs     08/26/24  1234   CAIONIZED 1.11*          Blood Gas    No recent results  No recent results LFTs  Recent Labs     08/26/24  1304   ALT 28   AST 65*   ALKPHOS 57   ALB 3.7   TBILI 1.50*       Infectious  No recent results  Glucose  Recent Labs     08/26/24  1304   GLUC 125                Jhonatan Flowers, MS4  [facility]

## 2024-08-26 NOTE — PROCEDURES
Universal Protocol:  Procedure performed by: (Hugo Lind)  Consent: Verbal consent obtained.  Consent given by: patient  Timeout called at: 8/26/2024 2:02 PM.  Patient understanding: patient states understanding of the procedure being performed  Patient consent: the patient's understanding of the procedure matches consent given  Procedure consent: procedure consent matches procedure scheduled  Relevant documents: relevant documents present and verified  Test results: test results available and properly labeled  Site marked: the operative site was not marked  Radiology Images displayed and confirmed. If images not available, report reviewed: imaging studies not available  Patient identity confirmed: verbally with patient  Laceration repair    Date/Time: 8/26/2024 2:01 PM    Performed by: Wolfgang Carrillo MD  Authorized by: Wolfgang Carrillo MD  Body area: upper extremity  Location details: left hand  Laceration length: 5 cm  Tendon involvement: none  Nerve involvement: none  Vascular damage: no  Anesthesia: local infiltration    Anesthesia:  Local Anesthetic: lidocaine 1% with epinephrine    Sedation:  Patient sedated: no      Wound Dehiscence:  Superficial Wound Dehiscence: simple closure      Procedure Details:  Preparation: Patient was prepped and draped in the usual sterile fashion.  Irrigation solution: saline  Irrigation method: syringe  Debridement: minimal  Degree of undermining: none  Skin closure: 4-0 Prolene  Number of sutures: 5  Technique: simple  Approximation: close  Approximation difficulty: simple

## 2024-08-26 NOTE — ASSESSMENT & PLAN NOTE
Presented after being found down by neighbors   Significant facial bruising and swelling   Xaerlto use with last dose yesterday     Intracerebral Hemorrhage (ICH) Score:    Roxane Coma Sore 13-15 equals +0   Age greater than or equal to 80 Yes (1 Point)   ICH Volume greater than or equal to 30 ml No   Intraventricular Hemorrhage Yes (1 Point)   Infratentorial Origin of Hemorrhage Yes (1 Point)   Total: 3       Imaging:   CT head 8/26/2024: Acute parenchymal hemorrhage in the cerebellum with associated IVH.  Some evidence hemorrhage in the third ventricle.  Heavily calcified extra-axial mass likely meningioma in the right parietal vertex.  Temporal horns mildly increased suggesting early hydrocephalus.  CTA 8/26/2024: No evidence of active extravasation and a cerebellar hematoma.  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  No intracranial stenosis or LVO.  No aneurysm.    Plan:   ongoing frequent neurological checks  Recommend STAT CT head for decline in GCS >2 points in 1 hour  Recommend repeat CT head in a.m. for stability  DVT ppx: SCD's only. Recommend stable CT head prior to initiation of pharm DVT ppx.   Hold all AC/AP medication at this time. Reversal per trauma team on initial evaluation.   PT/OT evaluation  Ongoing medical management and pain control per primary team  CM following for dispo planning  Patient GCS 14 at this time without focal deficit. Currently dose not require surgical intervention   Should patient decline can repeat CT head and evaluate for etiology being worsening bleed, mass effect in the posterior fossa or hydrocephalus.   Should patient have decline she would require aggressive surgical measures that her decline in neurological status would be associated with likely poor prognosis and complicated recovery.  SCC spoke with Daughter on the phone who lives in [place] and designated POA  DNR/DNI   Continue with full measures but if patient has neurological decline  family would likely proceed with hospice although no determination made at this time  Neurosurgery will continue to follow. Call with questions or concerns.

## 2024-08-26 NOTE — CASE MANAGEMENT
"   Case Management Progress Note    Patient name Rozina Pizano  Location [facility] MRN [id number]  : 1934 Date 2024       LOS (days): 0  Geometric Mean LOS (GMLOS) (days):   Days to GMLOS:        OBJECTIVE:        Current admission status: Emergency  Preferred Pharmacy: No Pharmacies Listed  Primary Care Provider: No primary care provider on file.    Primary Insurance:   Secondary Insurance:     PROGRESS NOTE:        CM responded to trauma alert.  Pt was brought to the ED via [facility] s/p being found down by her neighbors.   Phone calls were made yesterday @1700 to her dtr, Francheska Pizano [phone], but they didn't actually speak (family attributed it to a \"butt dial\")  Pt has various abrasions and ecchymosis   "

## 2024-08-26 NOTE — PLAN OF CARE
Problem: Prexisting or High Potential for Compromised Skin Integrity  Goal: Skin integrity is maintained or improved  Description: INTERVENTIONS:  - Identify patients at risk for skin breakdown  - Assess and monitor skin integrity  - Assess and monitor nutrition and hydration status  - Monitor labs   - Assess for incontinence   - Turn and reposition patient  - Assist with mobility/ambulation  - Relieve pressure over bony prominences  - Avoid friction and shearing  - Provide appropriate hygiene as needed including keeping skin clean and dry  - Evaluate need for skin moisturizer/barrier cream  - Collaborate with interdisciplinary team   - Patient/family teaching  - Consider wound care consult   Outcome: Progressing     Problem: PAIN - ADULT  Goal: Verbalizes/displays adequate comfort level or baseline comfort level  Description: Interventions:  - Encourage patient to monitor pain and request assistance  - Assess pain using appropriate pain scale  - Administer analgesics based on type and severity of pain and evaluate response  - Implement non-pharmacological measures as appropriate and evaluate response  - Consider cultural and social influences on pain and pain management  - Notify physician/advanced practitioner if interventions unsuccessful or patient reports new pain  Outcome: Progressing     Problem: INFECTION - ADULT  Goal: Absence or prevention of progression during hospitalization  Description: INTERVENTIONS:  - Assess and monitor for signs and symptoms of infection  - Monitor lab/diagnostic results  - Monitor all insertion sites, i.e. indwelling lines, tubes, and drains  - Monitor endotracheal if appropriate and nasal secretions for changes in amount and color  - [place] appropriate cooling/warming therapies per order  - Administer medications as ordered  - Instruct and encourage patient and family to use good hand hygiene technique  - Identify and instruct in appropriate isolation precautions for  identified infection/condition  Outcome: Progressing  Goal: Absence of fever/infection during neutropenic period  Description: INTERVENTIONS:  - Monitor WBC    Outcome: Progressing     Problem: SAFETY ADULT  Goal: Patient will remain free of falls  Description: INTERVENTIONS:  - Educate patient/family on patient safety including physical limitations  - Instruct patient to call for assistance with activity   - Consult OT/PT to assist with strengthening/mobility   - Keep Call bell within reach  - Keep bed low and locked with side rails adjusted as appropriate  - Keep care items and personal belongings within reach  - Initiate and maintain comfort rounds  - Make Fall Risk Sign visible to staff  - Offer Toileting every  Hours, in advance of need  - Initiate/Maintain alarm  - Obtain necessary fall risk management equipment:   - Apply yellow socks and bracelet for high fall risk patients  - Consider moving patient to room near nurses station  Outcome: Progressing  Goal: Maintain or return to baseline ADL function  Description: INTERVENTIONS:  -  Assess patient's ability to carry out ADLs; assess patient's baseline for ADL function and identify physical deficits which impact ability to perform ADLs (bathing, care of mouth/teeth, toileting, grooming, dressing, etc.)  - Assess/evaluate cause of self-care deficits   - Assess range of motion  - Assess patient's mobility; develop plan if impaired  - Assess patient's need for assistive devices and provide as appropriate  - Encourage maximum independence but intervene and supervise when necessary  - Involve family in performance of ADLs  - Assess for home care needs following discharge   - Consider OT consult to assist with ADL evaluation and planning for discharge  - Provide patient education as appropriate  Outcome: Progressing  Goal: Maintains/Returns to pre admission functional level  Description: INTERVENTIONS:  - Perform AM-PAC 6 Click Basic Mobility/ Daily Activity  assessment daily.  - Set and communicate daily mobility goal to care team and patient/family/caregiver.   - Collaborate with rehabilitation services on mobility goals if consulted  - Perform Range of Motion  times a day.  - Reposition patient every  hours.  - Dangle patient  times a day  - Stand patient  times a day  - Ambulate patient times a day  - Out of bed to chair  times a day   - Out of bed for meals  times a day  - Out of bed for toileting  - Record patient progress and toleration of activity level   Outcome: Progressing     Problem: DISCHARGE PLANNING  Goal: Discharge to home or other facility with appropriate resources  Description: INTERVENTIONS:  - Identify barriers to discharge w/patient and caregiver  - Arrange for needed discharge resources and transportation as appropriate  - Identify discharge learning needs (meds, wound care, etc.)  - Arrange for interpretive services to assist at discharge as needed  - Refer to Case Management Department for coordinating discharge planning if the patient needs post-hospital services based on physician/advanced practitioner order or complex needs related to functional status, cognitive ability, or social support system  Outcome: Progressing     Problem: Knowledge Deficit  Goal: Patient/family/caregiver demonstrates understanding of disease process, treatment plan, medications, and discharge instructions  Description: Complete learning assessment and assess knowledge base.  Interventions:  - Provide teaching at level of understanding  - Provide teaching via preferred learning methods  Outcome: Progressing     Problem: NEUROSENSORY - ADULT  Goal: Achieves stable or improved neurological status  Description: INTERVENTIONS  - Monitor and report changes in neurological status  - Monitor vital signs such as temperature, blood pressure, glucose, and any other labs ordered   - Initiate measures to prevent increased intracranial pressure  - Monitor for seizure activity and  implement precautions if appropriate      Outcome: Progressing  Goal: Remains free of injury related to seizures activity  Description: INTERVENTIONS  - Maintain airway, patient safety  and administer oxygen as ordered  - Monitor patient for seizure activity, document and report duration and description of seizure to physician/advanced practitioner  - If seizure occurs,  ensure patient safety during seizure  - Reorient patient post seizure  - Seizure pads on all 4 side rails  - Instruct patient/family to notify RN of any seizure activity including if an aura is experienced  - Instruct patient/family to call for assistance with activity based on nursing assessment  - Administer anti-seizure medications if ordered    Outcome: Progressing  Goal: Achieves maximal functionality and self care  Description: INTERVENTIONS  - Monitor swallowing and airway patency with patient fatigue and changes in neurological status  - Encourage and assist patient to increase activity and self care.   - Encourage visually impaired, hearing impaired and aphasic patients to use assistive/communication devices  Outcome: Progressing     Problem: CARDIOVASCULAR - ADULT  Goal: Maintains optimal cardiac output and hemodynamic stability  Description: INTERVENTIONS:  - Monitor I/O, vital signs and rhythm  - Monitor for S/S and trends of decreased cardiac output  - Administer and titrate ordered vasoactive medications to optimize hemodynamic stability  - Assess quality of pulses, skin color and temperature  - Assess for signs of decreased coronary artery perfusion  - Instruct patient to report change in severity of symptoms  Outcome: Progressing  Goal: Absence of cardiac dysrhythmias or at baseline rhythm  Description: INTERVENTIONS:  - Continuous cardiac monitoring, vital signs, obtain 12 lead EKG if ordered  - Administer antiarrhythmic and heart rate control medications as ordered  - Monitor electrolytes and administer replacement therapy as  ordered  Outcome: Progressing     Problem: RESPIRATORY - ADULT  Goal: Achieves optimal ventilation and oxygenation  Description: INTERVENTIONS:  - Assess for changes in respiratory status  - Assess for changes in mentation and behavior  - Position to facilitate oxygenation and minimize respiratory effort  - Oxygen administered by appropriate delivery if ordered  - Initiate smoking cessation education as indicated  - Encourage broncho-pulmonary hygiene including cough, deep breathe, Incentive Spirometry  - Assess the need for suctioning and aspirate as needed  - Assess and instruct to report SOB or any respiratory difficulty  - Respiratory Therapy support as indicated  Outcome: Progressing     Problem: GASTROINTESTINAL - ADULT  Goal: Minimal or absence of nausea and/or vomiting  Description: INTERVENTIONS:  - Administer IV fluids if ordered to ensure adequate hydration  - Maintain NPO status until nausea and vomiting are resolved  - Nasogastric tube if ordered  - Administer ordered antiemetic medications as needed  - Provide nonpharmacologic comfort measures as appropriate  - Advance diet as tolerated, if ordered  - Consider nutrition services referral to assist patient with adequate nutrition and appropriate food choices  Outcome: Progressing  Goal: Maintains or returns to baseline bowel function  Description: INTERVENTIONS:  - Assess bowel function  - Encourage oral fluids to ensure adequate hydration  - Administer IV fluids if ordered to ensure adequate hydration  - Administer ordered medications as needed  - Encourage mobilization and activity  - Consider nutritional services referral to assist patient with adequate nutrition and appropriate food choices  Outcome: Progressing  Goal: Maintains adequate nutritional intake  Description: INTERVENTIONS:  - Monitor percentage of each meal consumed  - Identify factors contributing to decreased intake, treat as appropriate  - Assist with meals as needed  - Monitor I&O,  weight, and lab values if indicated  - Obtain nutrition services referral as needed  Outcome: Progressing  Goal: Establish and maintain optimal ostomy function  Description: INTERVENTIONS:  - Assess bowel function  - Encourage oral fluids to ensure adequate hydration  - Administer IV fluids if ordered to ensure adequate hydration   - Administer ordered medications as needed  - Encourage mobilization and activity  - Nutrition services referral to assist patient with appropriate food choices  - Assess stoma site  - Consider wound care consult   Outcome: Progressing  Goal: Oral mucous membranes remain intact  Description: INTERVENTIONS  - Assess oral mucosa and hygiene practices  - Implement preventative oral hygiene regimen  - Implement oral medicated treatments as ordered  - Initiate Nutrition services referral as needed  Outcome: Progressing     Problem: GENITOURINARY - ADULT  Goal: Maintains or returns to baseline urinary function  Description: INTERVENTIONS:  - Assess urinary function  - Encourage oral fluids to ensure adequate hydration if ordered  - Administer IV fluids as ordered to ensure adequate hydration  - Administer ordered medications as needed  - Offer frequent toileting  - Follow urinary retention protocol if ordered  Outcome: Progressing  Goal: Absence of urinary retention  Description: INTERVENTIONS:  - Assess patient’s ability to void and empty bladder  - Monitor I/O  - Bladder scan as needed  - Discuss with physician/AP medications to alleviate retention as needed  - Discuss catheterization for long term situations as appropriate  Outcome: Progressing  Goal: Urinary catheter remains patent  Description: INTERVENTIONS:  - Assess patency of urinary catheter  - If patient has a chronic wallis, consider changing catheter if non-functioning  - Follow guidelines for intermittent irrigation of non-functioning urinary catheter  Outcome: Progressing     Problem: METABOLIC, FLUID AND ELECTROLYTES -  ADULT  Goal: Electrolytes maintained within normal limits  Description: INTERVENTIONS:  - Monitor labs and assess patient for signs and symptoms of electrolyte imbalances  - Administer electrolyte replacement as ordered  - Monitor response to electrolyte replacements, including repeat lab results as appropriate  - Instruct patient on fluid and nutrition as appropriate  Outcome: Progressing  Goal: Fluid balance maintained  Description: INTERVENTIONS:  - Monitor labs   - Monitor I/O and WT  - Instruct patient on fluid and nutrition as appropriate  - Assess for signs & symptoms of volume excess or deficit  Outcome: Progressing  Goal: Glucose maintained within target range  Description: INTERVENTIONS:  - Monitor Blood Glucose as ordered  - Assess for signs and symptoms of hyperglycemia and hypoglycemia  - Administer ordered medications to maintain glucose within target range  - Assess nutritional intake and initiate nutrition service referral as needed  Outcome: Progressing     Problem: SKIN/TISSUE INTEGRITY - ADULT  Goal: Skin Integrity remains intact(Skin Breakdown Prevention)  Description: Assess:  -Perform [place] assessment every   -Clean and moisturize skin every   -Inspect skin when repositioning, toileting, and assisting with ADLS  -Assess under medical devices such as  every   -Assess extremities for adequate circulation and sensation     Bed Management:  -Have minimal linens on bed & keep smooth, unwrinkled  -Change linens as needed when moist or perspiring  -Avoid sitting or lying in one position for more than  hours while in bed  -Keep HOB at degrees     Toileting:  -Offer bedside commode  -Assess for incontinence every   -Use incontinent care products after each incontinent episode such as     Activity:  -Mobilize patient  times a day  -Encourage activity and walks on unit  -Encourage or provide ROM exercises   -Turn and reposition patient every  Hours  -Use appropriate equipment to lift or move patient in  bed  -Instruct/ Assist with weight shifting every  when out of bed in chair  -Consider limitation of chair time  hour intervals    Skin Care:  -Avoid use of baby powder, tape, friction and shearing, hot water or constrictive clothing  -Relieve pressure over bony prominences using   -Do not massage red bony areas    Next Steps:  -Teach patient strategies to minimize risks such as    -Consider consults to  interdisciplinary teams such as   Outcome: Progressing  Goal: Incision(s), wounds(s) or drain site(s) healing without S/S of infection  Description: INTERVENTIONS  - Assess and document dressing, incision, wound bed, drain sites and surrounding tissue  - Provide patient and family education  - Perform skin care/dressing changes every   Outcome: Progressing  Goal: Pressure injury heals and does not worsen  Description: Interventions:  - Implement low air loss mattress or specialty surface (Criteria met)  - Apply silicone foam dressing  - Instruct/assist with weight shifting every  minutes when in chair   - Limit chair time to  hour intervals  - Use special pressure reducing interventions such as  when in chair   - Apply fecal or urinary incontinence containment device   - Perform passive or active ROM every   - Turn and reposition patient & offload bony prominences every  hours   - Utilize friction reducing device or surface for transfers   - Consider consults to  interdisciplinary teams such as   - Use incontinent care products after each incontinent episode such as   - Consider nutrition services referral as needed  Outcome: Progressing     Problem: HEMATOLOGIC - ADULT  Goal: Maintains hematologic stability  Description: INTERVENTIONS  - Assess for signs and symptoms of bleeding or hemorrhage  - Monitor labs  - Administer supportive blood products/factors as ordered and appropriate  Outcome: Progressing     Problem: MUSCULOSKELETAL - ADULT  Goal: Maintain or return mobility to safest level of function  Description:  INTERVENTIONS:  - Assess patient's ability to carry out ADLs; assess patient's baseline for ADL function and identify physical deficits which impact ability to perform ADLs (bathing, care of mouth/teeth, toileting, grooming, dressing, etc.)  - Assess/evaluate cause of self-care deficits   - Assess range of motion  - Assess patient's mobility  - Assess patient's need for assistive devices and provide as appropriate  - Encourage maximum independence but intervene and supervise when necessary  - Involve family in performance of ADLs  - Assess for home care needs following discharge   - Consider OT consult to assist with ADL evaluation and planning for discharge  - Provide patient education as appropriate  Outcome: Progressing  Goal: Maintain proper alignment of affected body part  Description: INTERVENTIONS:  - Support, maintain and protect limb and body alignment  - Provide patient/ family with appropriate education  Outcome: Progressing     Problem: Potential for Falls  Goal: Patient will remain free of falls  Description: INTERVENTIONS:  - Educate patient/family on patient safety including physical limitations  - Instruct patient to call for assistance with activity   - Consult OT/PT to assist with strengthening/mobility   - Keep Call bell within reach  - Keep bed low and locked with side rails adjusted as appropriate  - Keep care items and personal belongings within reach  - Initiate and maintain comfort rounds  - Make Fall Risk Sign visible to staff  - Offer Toileting every  Hours, in advance of need  - Initiate/Maintain alarm  - Obtain necessary fall risk management equipment:   - Apply yellow socks and bracelet for high fall risk patients  - Consider moving patient to room near nurses station  Outcome: Progressing     Problem: SAFETY,RESTRAINT: NV/NON-SELF DESTRUCTIVE BEHAVIOR  Goal: Remains free of harm/injury (restraint for non violent/non self-detsructive behavior)  Description: INTERVENTIONS:  - Instruct  patient/family regarding restraint use   - Assess and monitor physiologic and psychological status   - Provide interventions and comfort measures to meet assessed patient needs   - Identify and implement measures to help patient regain control  - Assess readiness for release of restraint   Outcome: Progressing  Goal: Returns to optimal restraint-free functioning  Description: INTERVENTIONS:  - Assess the patient's behavior and symptoms that indicate continued need for restraint  - Identify and implement measures to help patient regain control  - Assess readiness for release of restraint   Outcome: Progressing     Problem: COPING  Goal: Pt/Family able to verbalize concerns and demonstrate effective coping strategies  Description: INTERVENTIONS:  - Assist patient/family to identify coping skills, available support systems and cultural and spiritual values  - Provide emotional support, including active listening and acknowledgement of concerns of patient and caregivers  - Reduce environmental stimuli, as able  - Provide patient education  - Assess for spiritual pain/suffering and initiate spiritual care, including notification of Pastoral Care or joana based community as needed  - Assess effectiveness of coping strategies  Outcome: Progressing  Goal: Will report anxiety at manageable levels  Description: INTERVENTIONS:  - Administer medication as ordered  - Teach and encourage coping skills  - Provide emotional support  - Assess patient/family for anxiety and ability to cope  Outcome: Progressing     Problem: CHANGE IN BODY IMAGE  Goal: Pt/Family communicate acceptance of loss or change in body image and expresses psychological comfort and peace  Description: INTERVENTIONS:  - Assess patient/family anxiety and grief process related to change in body image, loss of functional status and loss of sense of self  - Assess patient/family's coping skills and provide emotional, spiritual and psychosocial support  - Provide  information about the patient's health status with consideration of family and cultural values  - Communicate willingness to discuss loss and facilitate grief process with patient/family as appropriate  - Emphasize sustaining relationships within family system and community, or joana/spiritual traditions  - Refer to community support groups as appropriate  - Initiate Spiritual Care, Pastoral care or other ancillary consults as needed  Outcome: Progressing     Problem: DECISION MAKING  Goal: Pt/Family able to effectively weigh alternatives and participate in decision making related to treatment and care  Description: INTERVENTIONS:  - Identify decision maker  - Determine when there are differences among patient's view, family's view, and healthcare provider's view of patient condition and care goals  - Facilitate patient/family articulation of goals for care  - Help patient/family identify pros/cons of alternative solutions  - Provide information as requested by patient/family  - Respect patient/family rights related to privacy and sharing information   - Serve as a liaison between patient, family and health care team  - Initiate consults as appropriate (Ethics Team, Palliative Care, Family Care Conference, etc.)  Outcome: Progressing     Problem: CONFUSION/THOUGHT DISTURBANCE  Goal: Thought disturbances (confusion, delirium, depression, dementia or psychosis) are managed to maintain or return to baseline mental status and functional level  Description: INTERVENTIONS:  - Assess for possible contributors to  thought disturbance, including but not limited to medications, infection, impaired vision or hearing, underlying metabolic abnormalities, dehydration, respiratory compromise,  psychiatric diagnoses and notify attending PHYSICAN/AP  - Monitor and intervene to maintain adequate nutrition, hydration, elimination, sleep and activity  - Decrease environmental stimuli, including noise as appropriate.  - Provide frequent  contacts to provide refocusing, direction and reassurance as needed. Approach patient calmly with eye contact and at their level.  - [place] high risk fall precautions, aspiration precautions and other safety measures, as indicated  - If delirium suspected, notify physician/AP of change in condition and request immediate in-person evaluation  - Pursue consults as appropriate including Geriatric (campus dependent), OT for cognitive evaluation/activity planning, psychiatric, pastoral care, etc.  Outcome: Progressing     Problem: BEHAVIOR  Goal: Pt/Family maintain appropriate behavior and adhere to behavioral management agreement, if implemented  Description: INTERVENTIONS:  - Assess the family dynamic   - Encourage verbalization of thoughts and concerns in a socially appropriate manner  - Assess patient/family's coping skills and non-compliant behavior (including use of illegal substances).  - Utilize positive, consistent limit setting strategies supporting safety of patient, staff and others  - Initiate consult with Case Management, Spiritual Care or other ancillary services as appropriate  - If a patient's/visitor's behavior jeopardizes the safety of the patient, staff, or others, refer to organization procedure.   - Notify Security of behavior or suspected illegal substances which indicate the need for search of the patient and/or belongings  - Encourage participation in the decision making process about a behavioral management agreement; implement if patient meets criteria  Outcome: Progressing

## 2024-08-26 NOTE — CONSULTS
Consultation - Geriatrics   Rozina Pizano 89 y.o. adult MRN: [id number]  Unit/Bed#: J LUIS Encounter: [id number]      Assessment/Plan  Intracerebral hemorrhage  S/p being found down by neighbors  On xarelto   Neurosurgery on consult  Started on keppra for ppx    Ambulatory dysfunction  At risk for HIGH falls secondary to age, hx of fall, gait instability, polypharmacy, visual impairment  Fall precautions  PT/OT  Increased physical exercise, recommend balance and strengthening exercises  Rehab post hospitalization     Acute pain due to trauma  Geriatric pain protocol  Scheduled acetaminophen 975 mg po Q8  Oxycodone 2.5 mg po Q 4 prn moderate pain  Oxycodone 5 mg po Q 4 prn severe pain   Monitor for constipation  Lidoderm patch      Frailty  Clinical Frail Scale: 6- Moderately Frail  Need help with all outside activities  Has paid caregiver 2 days a week.   Appears thin and frail  Albumin 3.7, maintain protein in diet  Continue remeron 7.5 mg po QHS  PT/OT  Recommend increased support, rehab post hospitalization, may need 24/7 supervision     Cognitive impairment  Short term memory loss and forgetfulness  Seen by neuro as outpatient 4/15/24   MOCA 14/22 (5/2024)  Recommend check TSH and vitamin B12  CT head (8/26/24)   Keep physically, mentally and socially active    Insomnia  Chronic   Continue remeron 7.5 mg po QHS  First line is behavioral therapy  Avoid sedative hypnotics such as benzodiazepines and benadryl  Encourage staying awake during the day  Encourage daytime activity, morning exercise  Decrease or eliminate day time naps  Avoid caffiene, alcohol especially during late afternoon and evening hours  Establish a night time routine  Recommend melatonin 3 mg po QHS      Delirium precautions  Baseline: alert and oriented x 3  Provide frequent redirection, reorientation, distraction techniques  Avoid deliriogenic medications such as tramadol, benzodiazepines, anticholinergics,  Benadryl  Treat pain, See  geriatric pain protocol  Monitor for constipation and urinary retention  Encourage early and frequent moblization, OOB  Encourage Hydration/ Nutrition  Implement sleep hygiene, limit night time interuptions, group activities     Insomnia  Related to hospitalization  First line is behavioral therapy  Avoid sedative hypnotics such as benzodiazepines and benadryl  Encourage staying awake during the day  Encourage daytime activity, morning exercise  Decrease or eliminate day time naps  Avoid caffeine especially during late afternoon and evening hours  Establish a night time routine    Advanced Care Planning  DNR/ DNI    Home medication review  Mail order   Xarelto 20 mg po daily, last refill 6/14/24 # 9) days  Sertraline 100 mg po Q am, sertraline 50 mg po QHS, last refill 6/4/24 # 90 days  Synthroid 150 mcg po daily, last refill 7/8/24, last refill 7/8/24 # 90 days  Midodrine 5 mg po TID, last refill 8/20/24   Mirtazapine 7.5 mg po QHS, last refill 8/9/24  Atorvastatin 20 mg po QHS, last refill 6/4/24 # 90 days        History of Present Illness   Physician Requesting Consult: Ye Nair MD  Reason for Consult / Principal Problem: ISAR greater than 2  Hx and PE limited by: NA  HPI: Rozina Pizano is a 89 y.o. year old adult who presents with being found down at home by a neighbor. Daughter reports talking to her night before at 5 pm.    She has depression, anxiety, memory loss, macular degeneration, hypothyroidism.     Prior to arrival she lives at home alone. She needs assistance with IADLs and ADLs. She has  paid caregiver 2 x week, who assist her with her instacart orders and medications. She has prior falls. She has poor vision, hearing impairment. She has memory loss.    Upon exam she is lying in bed, alert and oriented x 2. Calm and cooperative.         Inpatient consult to Gerontology  Consult performed by: CARLOS Lovelace  Consult ordered by: Peter Lind DO          Additional history  obtained from: Chart review and patient evaluation.    Review of Systems   Constitutional:  Negative for unexpected weight change.   HENT:  Negative for hearing loss.    Eyes:  Positive for visual disturbance.   Respiratory:  Negative for cough.    Cardiovascular:  Negative for chest pain.   Gastrointestinal:  Negative for constipation.   Genitourinary:  Negative for difficulty urinating.   Musculoskeletal:  Positive for arthralgias and gait problem.   Neurological:  Negative for dizziness.   Psychiatric/Behavioral:  Negative for sleep disturbance.        Historical Information   Past Medical History:   Diagnosis Date    Hypothyroidism     Macular degeneration      Past Surgical History:   Procedure Laterality Date    HERNIA REPAIR       Social History   Social History     Substance and Sexual Activity   Alcohol Use Not on file     Social History     Substance and Sexual Activity   Drug Use Not on file     Social History     Tobacco Use   Smoking Status Not on file   Smokeless Tobacco Not on file         Family History:   Family History   Problem Relation Age of Onset    Pancreatic cancer Mother     Heart disease Mother        Meds/Allergies   Current meds:   Current Facility-Administered Medications   Medication Dose Route Frequency    bacitracin topical ointment 1 small application  1 small application Topical Once    levETIRAcetam (KEPPRA) injection 500 mg  500 mg Intravenous Q12H WISAM        Not on File    Objective   Vitals: Blood pressure 151/84, pulse 94, temperature 98.3 °F (36.8 °C), temperature source Rectal, resp. rate 22, weight 58.2 kg (128 lb 4.9 oz), SpO2 97%.,There is no height or weight on file to calculate BMI.      Physical Exam  Vitals and nursing note reviewed.   Constitutional:       Appearance: Rozina is ill-appearing.      Comments: Cachetic    HENT:      Head: Normocephalic.      Nose: No congestion.      Mouth/Throat:      Mouth: Mucous membranes are moist.   Eyes:      General:          Right eye: No discharge.         Left eye: No discharge.   Cardiovascular:      Rate and Rhythm: Normal rate and regular rhythm.      Pulses: Normal pulses.   Pulmonary:      Effort: Pulmonary effort is normal.      Breath sounds: Normal breath sounds.   Abdominal:      General: Bowel sounds are normal.      Palpations: Abdomen is soft.   Musculoskeletal:         General: Signs of injury present.      Cervical back: Normal range of motion.   Skin:     General: Skin is warm and dry.      Findings: Bruising present.      Comments: Laceration to left hand, generalized facial ecchymosis and edema, bilateral shoulder abrasion   Neurological:      Mental Status: Rozina is alert. Mental status is at baseline.   Psychiatric:         Mood and Affect: Mood normal.         Lab Results:   I have personally reviewed pertinent lab results including the following:    Lab Results:   Results from last 7 days   Lab Units 08/26/24  1304   WBC Thousand/uL 16.87*   HEMOGLOBIN g/dL 9.3*   HEMATOCRIT % 27.7*   PLATELETS Thousands/uL 175        Results from last 7 days   Lab Units 08/26/24  1304 08/26/24  1234   POTASSIUM mmol/L 3.7  --    CHLORIDE mmol/L 103  --    CO2 mmol/L 25  --    CO2, I-STAT mmol/L  --  25   BUN mg/dL 25  --    CREATININE mg/dL 0.59*  --    CALCIUM mg/dL 8.3*  --    ALK PHOS U/L 57  --    ALT U/L 28  --    AST U/L 65*  --    GLUCOSE, ISTAT mg/dl  --  132       Imaging Studies: I have personally reviewed pertinent films in PACS  CT head (8/26/24)     Imaging Studies: I have personally reviewed pertinent films in PACS  EKG, Pathology, and Other Studies: I have personally reviewed pertinent films in PACS  VTE Prophylaxis: Sequential compression device (Venodyne)     Code Status: Level 3 - DNAR and DNI    I have spent a total time of 90 minutes in caring for this patient on the day of the visit/encounter including Diagnostic results, Prognosis, Risks and benefits of tx options, Instructions for management, Importance  of tx compliance, Risk factor reductions, Impressions, Counseling / Coordination of care, Documenting in the medical record, Reviewing / ordering tests, medicine, procedures  , Obtaining or reviewing history  , and Communicating with other healthcare professionals .

## 2024-08-26 NOTE — PROCEDURES
POC FAST US    Date/Time: 8/26/2024 1:10 PM    Performed by: Peter Lind DO  Authorized by: Peter Lind DO    Patient location:  ED  Other Assisting Provider: Yes (comment) (Dr. Bliss)    Procedure details:     Indications: blunt abdominal trauma      Technique: FAST      Views obtained:  Heart - Pericardial sac, LUQ - Splenorenal space, Suprapubic - Pouch of Greyson and RUQ - Palomares's Pouch    Image quality: diagnostic      Image availability:  Images available in PACS and video obtained  FAST Findings:     RUQ (Hepatorenal) free fluid: absent      LUQ (Splenorenal) free fluid: absent      Suprapubic free fluid: absent      Cardiac wall motion: identified      Pericardial effusion: absent    Interpretation:     Impressions: negative

## 2024-08-26 NOTE — H&P
H&P - Trauma   Rozina Pizano 89 y.o. adult MRN: [id number]  Unit/Bed#: CRB Encounter: [id number]    Trauma Alert: Level B   Model of Arrival: Ambulance    Trauma Team: Attending Dr. Wilkins  Consultants:     Neurosurgery:   - STAT consult; notified at 1300 via text, returned call/text yes  ;     Assessment & Plan   Active Problems / Assessment:     Airway: Intact  Breathing: bilateral clear breath sounds  Circulation: 2+ pulses throughout  Disability: confusion, otherwise harish. GCS 14    FAST: negative  CXR: negative  Hemodynamically stable    Secondary survey:   Diffuse, extensive facial and periorbital ecchymosis  Ecchymosis over left shoulder and scapula  Abrasion over left  Skin tear, laceration over dorsal left hand    CT: extensive intraparenchymal bleed (intraventricular and midbrain bleed)    Code status: DNR      Plan:   Stat Neurosurgery consult  Kcentra (reversing Xaralto)  Keppra  Tetanus shot  Head of bed 30-45 degrees  ICU admit    History of Present Illness     Chief Complaint: intraparenchymal brain bleed  Mechanism: fall vs medical bleed    HPI:    Rozina Pizano is a 89 y.o. adult with a hx of previous CVA, falls, HTN, headaches who presents via EMS found down in her apartment by neighbors, with facial bruising and blood surrounding the area. JAME is currently unknown, fall vs medical bleed. Of note, she takes Xaralto with her last dose yesterday. Her last known normal was 5 pm yesterday 8/25 via the daughter.    Review of Systems   Constitutional: Negative.    Respiratory: Negative.     Cardiovascular: Negative.    Gastrointestinal: Negative.    Genitourinary: Negative.      12-point, complete review of systems was reviewed and negative except as stated above.     Historical Information     No past medical history on file.  No past surgical history on file.   Unable to obtain history due to        Immunization History   Administered Date(s) Administered    Tdap 08/26/2024     Last  Tetanus: today  Family History: Non-contributory    Did you order a geriatric consult if the score was 2 or greater?: yes     Meds/Allergies   all current active meds have been reviewed  Allergies have not been reviewed;  Not on File    Objective   Initial Vitals:   Temperature: 98.3 °F (36.8 °C) (08/26/24 1228)  Pulse: 88 (08/26/24 1228)  Respirations: 18 (08/26/24 1228)  Blood Pressure: 131/73 (08/26/24 1228)    Primary Survey:   Airway:        Status: patent;                  Breathing:        Pre-hospital Interventions: none       Effort: normal       Right breath sounds: normal       Left breath sounds: normal  Circulation:        Rhythm: regular       Rate: regular   Right Pulses Left Pulses    R radial: 2+  R femoral: 2+  R pedal: 2+  R carotid: 2+  R popliteal: 2+ L radial: 2+  L femoral: 2+  L pedal: 2+  L carotid: 2+  L popliteal: 2+   Disability:        GCS: Eye: 4; Verbal: 4 Motor: 6 Total: 14       Right Pupil: round;  reactive         Left Pupil:  round;  reactive      R Motor Strength L Motor Strength    R : 5/5  R dorsiflex: 5/5  R plantarflex: 5/5 L : 5/5  L dorsiflex: 5/5  L plantarflex: 5/5          Exposure:       Completed: Yes      Secondary Survey:  Physical Exam  Constitutional:       Comments: confused   Cardiovascular:      Rate and Rhythm: Normal rate and regular rhythm.   Pulmonary:      Effort: Pulmonary effort is normal.      Breath sounds: Normal breath sounds.   Abdominal:      General: Abdomen is flat.      Palpations: Abdomen is soft.   Musculoskeletal:      Comments: Diffuse, extensive facial ecchymosis. Bruising over left shoulder and scapula. Skin tear, laceration on dorsal left hand         Invasive Devices       Peripheral Intravenous Line  Duration             Peripheral IV 08/26/24 Left Antecubital <1 day    Peripheral IV 08/26/24 Left;Ventral (anterior) Forearm <1 day                  Lab Results: I have personally reviewed all pertinent laboratory/test results  08/26/24 and in the preceding 24 hours.  Recent Labs     08/26/24  1234   HGB 12.2   HCT 36*   CO2 25   CAIONIZED 1.11*       Imaging Results: I have personally reviewed pertinent images saved in PACS. CT scan findings (and other pertinent positive findings on images) were discussed with radiology. My interpretation of the images/reports are as follows:  Chest Xray(s): negative for acute findings   FAST exam(s): negative for acute findings   CT Scan(s): positive for acute findings: Intraparenchymal (large intraventricular and midbrain acute bleed)   Additional Xray(s): N/A     Other Studies: CTA head and neck    Code Status: Level 1 - Full Code  Advance Directive and Living Will:      Power of :    POLST:

## 2024-08-26 NOTE — CONSULTS
[facility]  Consult  Name: Rozina Pizano 89 y.o. adult I MRN: [id number]  Unit/Bed#: J LUIS I Date of Admission: 8/26/2024   Date of Service: 8/26/2024 I Hospital Day: 0    Inpatient consult to Neurosurgery  Consult performed by: Gildardo Felix PA-C  Consult ordered by: Peter Lind, DO      Assessment & Plan   ICH (intracerebral hemorrhage) (HCC)  Assessment & Plan  Presented after being found down by neighbors   Significant facial bruising and swelling   Xaerlto use with last dose yesterday     Intracerebral Hemorrhage (ICH) Score:    [place] Sore 13-15 equals +0   Age greater than or equal to 80 Yes (1 Point)   ICH Volume greater than or equal to 30 ml No   Intraventricular Hemorrhage Yes (1 Point)   Infratentorial Origin of Hemorrhage Yes (1 Point)   Total: 3       Imaging:   CT head 8/26/2024: Acute parenchymal hemorrhage in the cerebellum with associated IVH.  Some evidence hemorrhage in the third ventricle.  Heavily calcified extra-axial mass likely meningioma in the right parietal vertex.  Temporal horns mildly increased suggesting early hydrocephalus.  CTA 8/26/2024: No evidence of active extravasation and a cerebellar hematoma.  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  No intracranial stenosis or LVO.  No aneurysm.    Plan:   ongoing frequent neurological checks  Recommend STAT CT head for decline in GCS >2 points in 1 hour  Recommend repeat CT head in a.m. for stability  DVT ppx: SCD's only. Recommend stable CT head prior to initiation of pharm DVT ppx.   Hold all AC/AP medication at this time. Reversal per trauma team on initial evaluation.   PT/OT evaluation  Ongoing medical management and pain control per primary team  CM following for dispo planning  Patient GCS 14 at this time without focal deficit. Currently dose not require surgical intervention   Should patient decline can repeat CT head and evaluate for etiology being  worsening bleed, mass effect in the posterior fossa or hydrocephalus.   Should patient have decline she would require aggressive surgical measures that her decline in neurological status would be associated with likely poor prognosis and complicated recovery.  [facility] spoke with Daughter on the phone who lives in [place] and designated POA  DNR/DNI   Continue with full measures but if patient has neurological decline family would likely proceed with hospice although no determination made at this time  Neurosurgery will continue to follow. Call with questions or concerns.          History of Present Illness   HPI: Rozina Pizano is a 89 y.o. adult with PMH including CVA, osteoporosis, cognitive impairment, anticoagulation use, urinary incontinence, macular degeneration who presents after being found down by her neighbor.  Patient's last known well was yesterday when she spoke on the phone with her daughter.  Patient is amnestic to the events that took place and does not have any memory of falling although has obvious external signs of trauma with facial swelling and bruising.  She currently denies any pain.  Denies any headaches, change in vision, trouble speech.  She is unable to open her left eye due to her facial swelling.  She denies any numbness tingling weakness in the arms or legs.  Patient denied any blood thinners although her duplicate chart states patient was taking Xarelto.  Patient is GCS 14 with some mild confusion to time.  Patient oriented to person and location.    Patient known to the neurosurgical service as she is followed up outpatient in the past years for her intracranial meningioma.  Patient last seen in 2023 and released to as needed follow-up.      Review of Systems   Constitutional:  Negative for activity change and fatigue.   HENT: Negative.     Eyes:  Positive for visual disturbance (cant open L eye).   Respiratory: Negative.     Cardiovascular: Negative.    Gastrointestinal:  Negative.    Musculoskeletal: Negative.    Skin:  Positive for color change and wound.   Neurological:  Negative for dizziness, speech difficulty, weakness, numbness and headaches.   Hematological:  Bruises/bleeds easily.   Psychiatric/Behavioral:  Positive for confusion. Negative for agitation and behavioral problems.        Historical Information   Past Medical History:   Diagnosis Date    Hypothyroidism     Macular degeneration      Past Surgical History:   Procedure Laterality Date    HERNIA REPAIR       Social History     Substance and Sexual Activity   Alcohol Use Not on file     Social History     Substance and Sexual Activity   Drug Use Not on file     Social History     Tobacco Use   Smoking Status Not on file   Smokeless Tobacco Not on file     Family History   Problem Relation Age of Onset    Pancreatic cancer Mother     Heart disease Mother        Meds/Allergies   all current active meds have been reviewed, current meds:   Current Facility-Administered Medications   Medication Dose Route Frequency    bacitracin topical ointment 1 small application  1 small application Topical Once    levETIRAcetam (KEPPRA) injection 500 mg  500 mg Intravenous Q12H WISAM   , and PTA meds:   None     Not on File    Objective   I/O       None            Physical Exam  Constitutional:       General: Rozina is not in acute distress.  HENT:      Head:      Comments: Patient with significant facial and scalp swelling with associated ecchymosis and edema.  Eyes:      Extraocular Movements: Extraocular movements intact and EOM normal.      Pupils: Pupils are equal, round, and reactive to light.      Comments: Bilateral scleral hemorrhage   Neck:      Comments: Cervical collar in place  Musculoskeletal:         General: No tenderness. Normal range of motion.      Cervical back: No tenderness.   Skin:     Comments: Significant ecchymosis throughout extremities.  Laceration to the left hand.   Neurological:      Mental Status:  Rozina is alert. Rozina is disoriented.      Cranial Nerves: No cranial nerve deficit.      Sensory: No sensory deficit.      Motor: No weakness.      Deep Tendon Reflexes:      Reflex Scores:       Bicep reflexes are 2+ on the right side and 2+ on the left side.       Brachioradialis reflexes are 2+ on the right side and 2+ on the left side.  Psychiatric:         Mood and Affect: Mood normal.         Speech: Speech normal.         Behavior: Behavior normal.       Neurologic Exam     Mental Status   Oriented to person.   Oriented to place.   Disoriented to time. Disoriented to year.   Attention: normal.   Speech: speech is normal   Level of consciousness: alert  Able to name object. Normal comprehension.     Cranial Nerves     CN III, IV, VI   Pupils are equal, round, and reactive to light.  Extraocular motions are normal.   Upgaze: normal  Downgaze: normal    CN V   Facial sensation intact.     CN VII   Facial expression full, symmetric.     CN VIII   CN VIII normal.   Hearing: intact  R gaze preference although able to track and cross midline. Assisted eye opening on the L due to facial ecchymosis and edema      Motor Exam   Muscle bulk: normal  Right arm tone: normal  Left arm tone: normal  Right arm pronator drift: present  Left arm pronator drift: present  Right leg tone: normal  Left leg tone: normalStrength grossly intact. No focal weakness     Sensory Exam   Light touch normal.     Gait, Coordination, and Reflexes     Tremor   Resting tremor: absent  Action tremor: absent    Reflexes   Right brachioradialis: 2+  Left brachioradialis: 2+  Right biceps: 2+  Left biceps: 2+  Right Crawford: absent  Left Crawford: absent    Vitals:Blood pressure 151/84, pulse 94, temperature 98.3 °F (36.8 °C), temperature source Rectal, resp. rate 22, weight 58.2 kg (128 lb 4.9 oz), SpO2 97%.,There is no height or weight on file to calculate BMI.     Lab Results:   Results from last 7 days   Lab Units 08/26/24  1304  "08/26/24  1234   WBC Thousand/uL 16.87*  --    HEMOGLOBIN g/dL 9.3*  --    I STAT HEMOGLOBIN g/dl  --  12.2   HEMATOCRIT % 27.7*  --    HEMATOCRIT, ISTAT %  --  36*   PLATELETS Thousands/uL 175  --    SEGS PCT % 83*  --    MONO PCT % 8  --    EOS PCT % 0  --      Results from last 7 days   Lab Units 08/26/24  1304 08/26/24  1234   SODIUM mmol/L 135  --    POTASSIUM mmol/L 3.7  --    CHLORIDE mmol/L 103  --    CO2 mmol/L 25  --    CO2, I-STAT mmol/L  --  25   BUN mg/dL 25  --    CREATININE mg/dL 0.59*  --    CALCIUM mg/dL 8.3*  --    ALK PHOS U/L 57  --    ALT U/L 28  --    AST U/L 65*  --    GLUCOSE, ISTAT mg/dl  --  132             Results from last 7 days   Lab Units 08/26/24  1304   INR  1.19   PTT seconds 28     No results found for: \"TROPONINT\"  ABG:No results found for: \"PHART\", \"UQD2HVJ\", \"PO2ART\", \"FQO8HIV\", \"Y2HAEVXV\", \"BEART\", \"SOURCE\"    Imaging Studies: I have personally reviewed pertinent reports.   and I have personally reviewed pertinent films in PACS    TRAUMA - CT facial bones wo contrast    Result Date: 8/26/2024  Impression: No acute fracture. Bilateral facial/scalp soft tissue swelling and subcutaneous hematomas Workstation performed: [id number]     TRAUMA - CT head wo contrast    Result Date: 8/26/2024  Impression: Acute parenchymal hematoma centered in the left cerebellum measures up to 4 cm with intraventricular extension.. Mild surrounding edema with effacement of the fourth ventricle. Small volume of subarachnoid hemorrhage. Temporal horns mildly increased in size suggesting early hydrocephalus Stable meningioma I personally discussed this study with Armando Bliss on 8/26/2024 1:07 PM. Workstation performed: [id number]     TRAUMA - CT spine cervical wo contrast    Result Date: 8/26/2024  Impression: No acute fracture. Atlantoaxial rotatory subluxation versus positional rotation. I personally discussed this study with Armando Bliss on 8/26/2024 1:41 PM. Workstation performed: [id number] "       EKG, Pathology, and Other Studies: I have personally reviewed pertinent reports.      VTE Prophylaxis: Sequential compression device (Venodyne)  and Reason for no pharmacologic prophylaxis intracranial hemorrhage    Code Status: Level 3 - DNAR and DNI  Advance Directive and Living Will:      Power of :    POLST:      Counseling / Coordination of Care  I spent 30 minutes with the patient.

## 2024-08-27 ENCOUNTER — APPOINTMENT (INPATIENT)
Dept: RADIOLOGY | Facility: HOSPITAL | Age: 89
DRG: 964 | End: 2024-08-27
Payer: MEDICARE

## 2024-08-27 LAB
ANION GAP SERPL CALCULATED.3IONS-SCNC: 10 MMOL/L (ref 4–13)
ATRIAL RATE: 97 BPM
BASOPHILS # BLD AUTO: 0.02 THOUSANDS/ÂΜL (ref 0–0.1)
BASOPHILS NFR BLD AUTO: 0 % (ref 0–1)
BUN SERPL-MCNC: 19 MG/DL (ref 5–25)
CALCIUM SERPL-MCNC: 8 MG/DL (ref 8.4–10.2)
CHLORIDE SERPL-SCNC: 104 MMOL/L (ref 96–108)
CK SERPL-CCNC: 869 U/L (ref 39–192)
CO2 SERPL-SCNC: 24 MMOL/L (ref 21–32)
CREAT SERPL-MCNC: 0.47 MG/DL (ref 0.6–1.3)
EOSINOPHIL # BLD AUTO: 0 THOUSAND/ÂΜL (ref 0–0.61)
EOSINOPHIL NFR BLD AUTO: 0 % (ref 0–6)
ERYTHROCYTE [DISTWIDTH] IN BLOOD BY AUTOMATED COUNT: 14.3 % (ref 11.6–15.1)
GLUCOSE SERPL-MCNC: 116 MG/DL (ref 65–140)
HCT VFR BLD AUTO: 27.1 % (ref 36.5–46.1)
HGB BLD-MCNC: 9.2 G/DL (ref 12–15.4)
IMM GRANULOCYTES # BLD AUTO: 0.06 THOUSAND/UL (ref 0–0.2)
IMM GRANULOCYTES NFR BLD AUTO: 1 % (ref 0–2)
LYMPHOCYTES # BLD AUTO: 1.27 THOUSANDS/ÂΜL (ref 0.6–4.47)
LYMPHOCYTES NFR BLD AUTO: 10 % (ref 14–44)
MAGNESIUM SERPL-MCNC: 2.3 MG/DL (ref 1.9–2.7)
MCH RBC QN AUTO: 33.9 PG (ref 26.8–34.3)
MCHC RBC AUTO-ENTMCNC: 33.9 G/DL (ref 31.4–37.4)
MCV RBC AUTO: 100 FL (ref 82–98)
MONOCYTES # BLD AUTO: 0.92 THOUSAND/ÂΜL (ref 0.17–1.22)
MONOCYTES NFR BLD AUTO: 7 % (ref 4–12)
NEUTROPHILS # BLD AUTO: 10.74 THOUSANDS/ÂΜL (ref 1.85–7.62)
NEUTS SEG NFR BLD AUTO: 82 % (ref 43–75)
NRBC BLD AUTO-RTO: 0 /100 WBCS
P AXIS: 33 DEGREES
PHOSPHATE SERPL-MCNC: 3.7 MG/DL (ref 2.3–4.1)
PLATELET # BLD AUTO: 163 THOUSANDS/UL (ref 149–390)
PMV BLD AUTO: 9.1 FL (ref 8.9–12.7)
POTASSIUM SERPL-SCNC: 3.1 MMOL/L (ref 3.5–5.3)
PR INTERVAL: 102 MS
QRS AXIS: -3 DEGREES
QRSD INTERVAL: 90 MS
QT INTERVAL: 408 MS
QTC INTERVAL: 518 MS
RBC # BLD AUTO: 2.71 MILLION/UL (ref 3.88–5.12)
SODIUM SERPL-SCNC: 138 MMOL/L (ref 135–147)
T WAVE AXIS: 63 DEGREES
VENTRICULAR RATE: 97 BPM
WBC # BLD AUTO: 13.01 THOUSAND/UL (ref 4.31–10.16)

## 2024-08-27 PROCEDURE — NC001 PR NO CHARGE: Performed by: EMERGENCY MEDICINE

## 2024-08-27 PROCEDURE — NC001 PR NO CHARGE: Performed by: SURGERY

## 2024-08-27 PROCEDURE — 85025 COMPLETE CBC W/AUTO DIFF WBC: CPT

## 2024-08-27 PROCEDURE — 82550 ASSAY OF CK (CPK): CPT

## 2024-08-27 PROCEDURE — 80048 BASIC METABOLIC PNL TOTAL CA: CPT

## 2024-08-27 PROCEDURE — 93010 ELECTROCARDIOGRAM REPORT: CPT | Performed by: INTERNAL MEDICINE

## 2024-08-27 PROCEDURE — 84100 ASSAY OF PHOSPHORUS: CPT

## 2024-08-27 PROCEDURE — 99232 SBSQ HOSP IP/OBS MODERATE 35: CPT | Performed by: SURGERY

## 2024-08-27 PROCEDURE — 99232 SBSQ HOSP IP/OBS MODERATE 35: CPT | Performed by: STUDENT IN AN ORGANIZED HEALTH CARE EDUCATION/TRAINING PROGRAM

## 2024-08-27 PROCEDURE — 83735 ASSAY OF MAGNESIUM: CPT

## 2024-08-27 PROCEDURE — 70450 CT HEAD/BRAIN W/O DYE: CPT

## 2024-08-27 PROCEDURE — 99232 SBSQ HOSP IP/OBS MODERATE 35: CPT | Performed by: NURSE PRACTITIONER

## 2024-08-27 RX ORDER — POTASSIUM CHLORIDE 14.9 MG/ML
20 INJECTION INTRAVENOUS
Status: DISCONTINUED | OUTPATIENT
Start: 2024-08-27 | End: 2024-08-27

## 2024-08-27 RX ORDER — HALOPERIDOL 5 MG/ML
0.5 INJECTION INTRAMUSCULAR EVERY 2 HOUR PRN
Status: DISCONTINUED | OUTPATIENT
Start: 2024-08-27 | End: 2024-08-28

## 2024-08-27 RX ORDER — LORAZEPAM 2 MG/ML
1 INJECTION INTRAMUSCULAR
Status: DISCONTINUED | OUTPATIENT
Start: 2024-08-27 | End: 2024-08-28

## 2024-08-27 RX ORDER — LORAZEPAM 2 MG/ML
1 INJECTION INTRAMUSCULAR
Status: DISCONTINUED | OUTPATIENT
Start: 2024-08-27 | End: 2024-08-27

## 2024-08-27 RX ORDER — GLYCOPYRROLATE 0.2 MG/ML
0.1 INJECTION INTRAMUSCULAR; INTRAVENOUS EVERY 4 HOURS PRN
Status: DISCONTINUED | OUTPATIENT
Start: 2024-08-27 | End: 2024-08-28

## 2024-08-27 RX ORDER — MINERAL OIL AND PETROLATUM 150; 830 MG/G; MG/G
OINTMENT OPHTHALMIC EVERY 4 HOURS
Status: DISCONTINUED | OUTPATIENT
Start: 2024-08-27 | End: 2024-08-29 | Stop reason: HOSPADM

## 2024-08-27 RX ORDER — POTASSIUM CHLORIDE 14.9 MG/ML
20 INJECTION INTRAVENOUS
Status: COMPLETED | OUTPATIENT
Start: 2024-08-27 | End: 2024-08-28

## 2024-08-27 RX ORDER — MAGNESIUM SULFATE HEPTAHYDRATE 40 MG/ML
2 INJECTION, SOLUTION INTRAVENOUS ONCE
Status: COMPLETED | OUTPATIENT
Start: 2024-08-27 | End: 2024-08-27

## 2024-08-27 RX ORDER — SODIUM CHLORIDE, SODIUM GLUCONATE, SODIUM ACETATE, POTASSIUM CHLORIDE, MAGNESIUM CHLORIDE, SODIUM PHOSPHATE, DIBASIC, AND POTASSIUM PHOSPHATE .53; .5; .37; .037; .03; .012; .00082 G/100ML; G/100ML; G/100ML; G/100ML; G/100ML; G/100ML; G/100ML
500 INJECTION, SOLUTION INTRAVENOUS ONCE
Status: COMPLETED | OUTPATIENT
Start: 2024-08-27 | End: 2024-08-27

## 2024-08-27 RX ADMIN — SODIUM CHLORIDE, SODIUM GLUCONATE, SODIUM ACETATE, POTASSIUM CHLORIDE, MAGNESIUM CHLORIDE, SODIUM PHOSPHATE, DIBASIC, AND POTASSIUM PHOSPHATE 500 ML: .53; .5; .37; .037; .03; .012; .00082 INJECTION, SOLUTION INTRAVENOUS at 11:21

## 2024-08-27 RX ADMIN — LEVETIRACETAM 500 MG: 100 INJECTION, SOLUTION INTRAVENOUS at 08:13

## 2024-08-27 RX ADMIN — CHLORHEXIDINE GLUCONATE 0.12% ORAL RINSE 15 ML: 1.2 LIQUID ORAL at 08:13

## 2024-08-27 RX ADMIN — SODIUM CHLORIDE, SODIUM GLUCONATE, SODIUM ACETATE, POTASSIUM CHLORIDE, MAGNESIUM CHLORIDE, SODIUM PHOSPHATE, DIBASIC, AND POTASSIUM PHOSPHATE 125 ML/HR: .53; .5; .37; .037; .03; .012; .00082 INJECTION, SOLUTION INTRAVENOUS at 09:10

## 2024-08-27 RX ADMIN — MAGNESIUM SULFATE HEPTAHYDRATE 2 G: 40 INJECTION, SOLUTION INTRAVENOUS at 07:27

## 2024-08-27 RX ADMIN — WHITE PETROLATUM 57.7 %-MINERAL OIL 31.9 % EYE OINTMENT: at 17:38

## 2024-08-27 RX ADMIN — POTASSIUM CHLORIDE 20 MEQ: 14.9 INJECTION, SOLUTION INTRAVENOUS at 07:27

## 2024-08-27 RX ADMIN — LORAZEPAM 1 MG: 2 INJECTION INTRAMUSCULAR; INTRAVENOUS at 19:40

## 2024-08-27 RX ADMIN — SODIUM CHLORIDE, SODIUM GLUCONATE, SODIUM ACETATE, POTASSIUM CHLORIDE, MAGNESIUM CHLORIDE, SODIUM PHOSPHATE, DIBASIC, AND POTASSIUM PHOSPHATE 125 ML/HR: .53; .5; .37; .037; .03; .012; .00082 INJECTION, SOLUTION INTRAVENOUS at 01:07

## 2024-08-27 RX ADMIN — POTASSIUM CHLORIDE 20 MEQ: 14.9 INJECTION, SOLUTION INTRAVENOUS at 09:06

## 2024-08-27 NOTE — PROGRESS NOTES
UPDATE NOTE:    I had a goals of care conversation with the patient's daughter, son, and daughter-in-law. We reviewed the imaging and discussed her current prognosis. The patient's family verbalized understanding that they have seen a decline in her mental status, alertness, and poor oral intake. They also verbalized that there is no further intervention that would change her overall prognosis. We discussed their goals of transitioning their mom to comfort measures and hospice. An inpatient hospice consult and comfort orders were placed. All questions were answered.    Jacqueline Gaines, DO [facility] FELLOW

## 2024-08-27 NOTE — OCCUPATIONAL THERAPY NOTE
Occupational Therapy         Patient Name: Rozina Pizano  Today's Date: 8/27/2024 08/27/24 1300   OT Last Visit   OT Visit Date 08/27/24   Note Type   Note type Cancelled Session   Cancel Reasons Medical status   Additional Comments RN request to defer       Lupis Walden

## 2024-08-27 NOTE — PROGRESS NOTES
Progress Note - Rozina Pizano 89 y.o. adult MRN: [id number]    Unit/Bed#: ICU 11 Encounter: [id number]      Assessment/Plan:  Intracerebral hemorrhage  S/p being found down by neighbors  Prior to arrival taking xarelto for hx of embolic stroke, reversed in ED with Kcentra  Neurosurgery on consult  Started on keppra for ppx     Ambulatory dysfunction  At risk for HIGH falls secondary to age, hx of fall, gait instability, polypharmacy, visual impairment  Fall precautions  PT/OT  Increased physical exercise, recommend balance and strengthening exercises  Rehab post hospitalization     Acute pain due to trauma  Geriatric pain protocol  Scheduled acetaminophen 975 mg po Q8  Monitor for constipation    Frailty  Clinical Frail Scale: 6- Moderately Frail  Need help with all outside activities  Has paid caregiver 2 days a week.   Appears thin and frail  Albumin 3.7, maintain protein in diet  Continue remeron 7.5 mg po QHS  PT/OT  Recommend increased support, rehab post hospitalization, may need 24/7 supervision     Cognitive impairment  Short term memory loss and forgetfulness  Seen by neuro as outpatient 4/15/24   MOCA 14/22 (5/2024)  Recommend check TSH and vitamin B12  CT head (8/26/24)   Keep physically, mentally and socially active     Insomnia  Chronic   Continue remeron 7.5 mg po QHS  First line is behavioral therapy  Avoid sedative hypnotics such as benzodiazepines and benadryl  Encourage staying awake during the day  Encourage daytime activity, morning exercise  Decrease or eliminate day time naps  Avoid caffiene, alcohol especially during late afternoon and evening hours  Establish a night time routine  Recommend melatonin 3 mg po QHS      Altered mental status  Now decreased mentation, difficult to arouse, was talkative, oriented x 2 on admission  Provide frequent redirection, reorientation, distraction techniques  Avoid deliriogenic medications such as tramadol, benzodiazepines, anticholinergics,   "Benadryl  Treat pain, See geriatric pain protocol  Monitor for constipation and urinary retention  Encourage early and frequent moblization, OOB  Encourage Hydration/ Nutrition  Implement sleep hygiene, limit night time interuptions, group activities     Insomnia  Chronic,  Use of remeron at home  First line is behavioral therapy  Avoid sedative hypnotics such as benzodiazepines and benadryl  Encourage staying awake during the day  Encourage daytime activity, morning exercise  Decrease or eliminate day time naps  Avoid caffeine especially during late afternoon and evening hours  Establish a night time routine        Subjective:   She is lying in bed, eyes closed, groans in response to tactile stimuli.    Objective:     Vitals: Blood pressure 152/80, pulse 82, temperature 98.1 °F (36.7 °C), temperature source Oral, resp. rate 16, height 5' 3\" (1.6 m), weight 50.3 kg (110 lb 14.3 oz), SpO2 97%.,Body mass index is 19.64 kg/m².      Intake/Output Summary (Last 24 hours) at 8/27/2024 0957  Last data filed at 8/27/2024 0800  Gross per 24 hour   Intake 3302.5 ml   Output 1055 ml   Net 2247.5 ml       Physical Exam:   General : NAD, thin and frail  HEENT : MMM   Heart : Normal rate, no murmur rub or gallop  Lungs : CTA no wheezes, rales or rhonchi  Abdomen : Soft, NT/ND, BS auscultated in all 4 quads.   Ext :  no edema  Skin : Pink, warm, dry, age appropriate turgor and mobility  Neuro : Nonfocal  Psych : lethargic    Invasive Devices       Peripheral Intravenous Line  Duration             Peripheral IV 08/26/24 Left Antecubital <1 day    Peripheral IV 08/26/24 Left;Ventral (anterior) Forearm <1 day              Drain  Duration             Urethral Catheter Non-latex 16 Fr. <1 day                    Lab, Imaging and other studies: I have personally reviewed pertinent films in PACS  VTE Pharmacologic Prophylaxis: Sequential compression device (Venodyne)   VTE Mechanical Prophylaxis: sequential compression device   "

## 2024-08-27 NOTE — ASSESSMENT & PLAN NOTE
Large cerebellar ICH with IVH and mild hydrocephalus  Presented after being found down by neighbors   Significant facial bruising and swelling   On xarelto as an outpatient, reversed with kcentra    Intracerebral Hemorrhage (ICH) Score:    Roxane Coma Sore 13-15 equals +0   Age greater than or equal to 80 Yes (1 Point)   ICH Volume greater than or equal to 30 ml No   Intraventricular Hemorrhage Yes (1 Point)   Infratentorial Origin of Hemorrhage Yes (1 Point)   Total: 3       Imaging:   CT head wo contrast 8/27/2024: Relatively stable large acute intraparenchymal hemorrhage involving the cerebellum centered just to left of midline, with extension into the fourth ventricle. Surrounding vasogenic edema with effacement of the fourth ventricle again demonstrated. Subarachnoid hemorrhage noted along the right parietal convexity, grossly unchanged. Additional subarachnoid hemorrhage or intraparenchymal hemorrhage involving the right occipital lobe grossly unchanged, and trace subarachnoid hemorrhage noted along the left sylvian fissure and left parietal lobe, new when compared to the prior study. Stable intraventricular hemorrhage also noted involving the third ventricle, and right lateral ventricle. Stable mild ventriculomegaly involving the lateral and third ventricles indicative of developing mild hydrocephalus. Extensive white matter low-attenuation changes involving both cerebral hemispheres, which statistically likely represent extensive chronic microangiopathic changes. Densely calcified meningioma along the right parietal lobe near the vertex. Scalp hematomas as described above.  CTA head and neck w wo contrast 8/26/2024: No evidence of active extravasation and a cerebellar hematoma.  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  No intracranial stenosis or LVO.  No aneurysm.    Plan:   Imaging reviewed, ongoing extensive hemorrhage noted, grossly unchanged, with unchanged mild ventriculomegaly  (likely developing hydrocephalus).  There is mention of new SAH which is small.  As previously discussed, hemorrhage is quite extensive and given patient's age and current exam, unsure surgical intervention would be of benefit or change overall poor prognosis.  Daughter in [place] is POA - patient remains level III code for now, per [facility] they would be okay with EVD placement however would hold off on this for now given stable hydro and unlikely to  down the line (no aggressive management is recommended).  Continue with medical management.  This does not appear to be traumatic in nature and other etiologies of hemorrhage should be ruled out. Should consider MRI brain w wo contrast for further evaluation of hemorrhage if warranted.  Continue to monitor neuro exam closely  Plan for repeat CT head in the am for stability if MRI not completed timely  Recommend STAT CT head for decline in GCS >2 points in 1 hour  DVT ppx: SCD's only.   Hold all AC/AP medication at this time. Given kcentra for xarelto use as an outpatient  SBP per trauma, but would advise < 160  PLT > 100, INR < 1.4  PT/OT evaluation  Ongoing medical management and pain control per primary team    Neurosurgery will continue to follow. Call with questions or concerns.

## 2024-08-27 NOTE — QUICK NOTE
Per chart review and [facility], family has decided to move forward with comfort measures given patient continues to decline neurologically and it is unlikely that surgical intervention would preserve functional status.  No further NSX needs at this time, signing off, please reach out with questions or concerns.

## 2024-08-27 NOTE — PROGRESS NOTES
[facility]  Progress Note  Name: Rozina Pizano I  MRN: [id number]  Unit/Bed#: ICU 11 I Date of Admission: 8/26/2024   Date of Service: 8/27/2024 I Hospital Day: 1    Assessment & Plan   ICH (intracerebral hemorrhage) (HCC)  Assessment & Plan  Large cerebellar ICH with IVH and mild hydrocephalus  Presented after being found down by neighbors   Significant facial bruising and swelling   On xarelto as an outpatient, reversed with kcentra    Intracerebral Hemorrhage (ICH) Score:    Roxane Coma Sore 13-15 equals +0   Age greater than or equal to 80 Yes (1 Point)   ICH Volume greater than or equal to 30 ml No   Intraventricular Hemorrhage Yes (1 Point)   Infratentorial Origin of Hemorrhage Yes (1 Point)   Total: 3       Imaging:   CT head wo contrast 8/27/2024: Relatively stable large acute intraparenchymal hemorrhage involving the cerebellum centered just to left of midline, with extension into the fourth ventricle. Surrounding vasogenic edema with effacement of the fourth ventricle again demonstrated. Subarachnoid hemorrhage noted along the right parietal convexity, grossly unchanged. Additional subarachnoid hemorrhage or intraparenchymal hemorrhage involving the right occipital lobe grossly unchanged, and trace subarachnoid hemorrhage noted along the left sylvian fissure and left parietal lobe, new when compared to the prior study. Stable intraventricular hemorrhage also noted involving the third ventricle, and right lateral ventricle. Stable mild ventriculomegaly involving the lateral and third ventricles indicative of developing mild hydrocephalus. Extensive white matter low-attenuation changes involving both cerebral hemispheres, which statistically likely represent extensive chronic microangiopathic changes. Densely calcified meningioma along the right parietal lobe near the vertex. Scalp hematomas as described above.  CTA head and neck w wo contrast 8/26/2024: No  evidence of active extravasation and a cerebellar hematoma.  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  No intracranial stenosis or LVO.  No aneurysm.    Plan:   Imaging reviewed, ongoing extensive hemorrhage noted, grossly unchanged, with unchanged mild ventriculomegaly (likely developing hydrocephalus).  There is mention of new SAH which is small.  As previously discussed, hemorrhage is quite extensive and given patient's age and current exam, unsure surgical intervention would be of benefit or change overall poor prognosis.  Daughter in [place] is POA - patient remains level III code for now, per [facility] they would be okay with EVD placement however would hold off on this for now given stable hydro and unlikely to  down the line (no aggressive management is recommended).  Continue with medical management.  This does not appear to be traumatic in nature and other etiologies of hemorrhage should be ruled out. Should consider MRI brain w wo contrast for further evaluation of hemorrhage if warranted.  Continue to monitor neuro exam closely  Plan for repeat CT head in the am for stability if MRI not completed timely  Recommend STAT CT head for decline in GCS >2 points in 1 hour  DVT ppx: SCD's only.   Hold all AC/AP medication at this time. Given kcentra for xarelto use as an outpatient  SBP per trauma, but would advise < 160  PLT > 100, INR < 1.4  PT/OT evaluation  Ongoing medical management and pain control per primary team    Neurosurgery will continue to follow. Call with questions or concerns.          Subjective/Objective   Chief Complaint: follow up ICH / IVH    Subjective: Patient is difficult to arouse this morning.  She was unable to participate in ROS.  Had repeat CTH which is grossly stable with trace new SAH.  Per [facility], daughter is POA, they have opted for EVD if warranted but does not seem that they want any aggressive measures otherwise.    Objective: laying in bed,  "NAD, significant facial bruising, difficult to arouse    I/O         08/25 0701  08/26 0700 08/26 0701  08/27 0700 08/27 0701 08/28 0700    I.V. (mL/kg)  3302.5 (65.7)     Total Intake(mL/kg)  3302.5 (65.7)     Urine (mL/kg/hr)  1025 30 (0.2)    Total Output  1025 30    Net  +2277.5 -30                   Invasive Devices       Peripheral Intravenous Line  Duration             Peripheral IV 08/26/24 Left Antecubital <1 day    Peripheral IV 08/26/24 Left;Ventral (anterior) Forearm <1 day              Drain  Duration             Urethral Catheter Non-latex 16 Fr. <1 day                    Physical Exam:  Vitals: Blood pressure 152/80, pulse 82, temperature 98.1 °F (36.7 °C), temperature source Oral, resp. rate 16, height 5' 3\" (1.6 m), weight 50.3 kg (110 lb 14.3 oz), SpO2 97%.,Body mass index is 19.64 kg/m².      General appearance: tired, difficult to arouse  Head: severe facial bruising and swelling noted  Eyes: unable to open eyes due to periorbital swelling and bruising  Neck: supple, symmetrical, trachea midline   Lungs: non labored breathing  Heart: regular heart rate  Neurologic:   Mental status: GCS 9-10 (E1, V4, M5-6), cannot open eyes due to extensive periorbital swelling and bruising, monosyllabic answers, able to state first name but otherwise only says \"ouch\" to painful stimuli, very tired and difficult to arouse, able to  in right hand and wiggle toes on the right but otherwise does not follow commands, does move all extremities to pain  Cranial nerves: grossly intact (Cranial nerves II-XII) - right facial droop  Sensory: intact to crude touch  Motor: does not follow commands well enough to get strength exam      Lab Results:  Results from last 7 days   Lab Units 08/27/24  0440 08/26/24  1304 08/26/24  1234   WBC Thousand/uL 13.01* 16.87*  --    HEMOGLOBIN g/dL 9.2* 9.3*  --    I STAT HEMOGLOBIN g/dl  --   --  12.2   HEMATOCRIT % 27.1* 27.7*  --    HEMATOCRIT, ISTAT %  --   --  36*   PLATELETS " Thousands/uL 163 175  --    SEGS PCT % 82* 83*  --    MONO PCT % 7 8  --    EOS PCT % 0 0  --      Results from last 7 days   Lab Units 08/27/24  0440 08/26/24  1632 08/26/24  1304 08/26/24  1234   SODIUM mmol/L 138 140 135  --    POTASSIUM mmol/L 3.1* 3.9 3.7  --    CHLORIDE mmol/L 104 104 103  --    CO2 mmol/L 24 26 25  --    CO2, I-STAT mmol/L  --   --   --  25   BUN mg/dL 19 23 25  --    CREATININE mg/dL 0.47* 0.55* 0.59*  --    CALCIUM mg/dL 8.0* 9.0 8.3*  --    ALK PHOS U/L  --   --  57  --    ALT U/L  --   --  28  --    AST U/L  --   --  65*  --    GLUCOSE, ISTAT mg/dl  --   --   --  132     Results from last 7 days   Lab Units 08/27/24  0440   MAGNESIUM mg/dL 2.3     Results from last 7 days   Lab Units 08/27/24  0440   PHOSPHORUS mg/dL 3.7     Results from last 7 days   Lab Units 08/26/24  1304   INR  1.19   PTT seconds 28         Imaging Studies: I have personally reviewed pertinent reports.   and I have personally reviewed pertinent films in PACS    CT head wo contrast    Result Date: 8/27/2024  Impression: 1. Relatively stable large acute intraparenchymal hemorrhage involving the cerebellum centered just to left of midline, with extension into the fourth ventricle. Surrounding vasogenic edema with effacement of the fourth ventricle again demonstrated. 2. Subarachnoid hemorrhage noted along the right parietal convexity, grossly unchanged. Additional subarachnoid hemorrhage or intraparenchymal hemorrhage involving the right occipital lobe grossly unchanged, and trace subarachnoid hemorrhage noted along the left sylvian fissure and left parietal lobe, new when compared to the prior study. 3. Stable intraventricular hemorrhage also noted involving the third ventricle, and right lateral ventricle. Stable mild ventriculomegaly involving the lateral and third ventricles indicative of developing mild hydrocephalus. 4. Extensive white matter low-attenuation changes involving both cerebral hemispheres, which  statistically likely represent extensive chronic microangiopathic changes. 5. Densely calcified meningioma along the right parietal lobe near the vertex. 6. Scalp hematomas as described above. The study was marked in EPIC for immediate notification. Workstation performed: [id number]     TRAUMA - CT chest abdomen pelvis w contrast    Result Date: 8/26/2024  Impression: No acute intrathoracic or intra-abdominal pathology. I personally discussed this study with Armando Bliss on 8/26/2024 1:41 PM. Workstation performed: [id number]     XR Trauma multiple (SLB/SLRA trauma bay ONLY)    Result Date: 8/26/2024  Impression: No acute cardiopulmonary disease within limitations of supine imaging. Computerized Assisted Algorithm (CAA) may have been used to analyze all applicable images. Workstation performed: [id number]     XR chest 1 view    Result Date: 8/26/2024  Impression: No acute cardiopulmonary disease within limitations of supine imaging. Computerized Assisted Algorithm (CAA) may have been used to analyze all applicable images. 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 No other intracranial stenosis, large vessel occlusion or aneurysm. No active contrast extravasation into cerebellar hematoma described on concurrent head CT. Workstation performed: [id number]     TRAUMA - CT facial bones wo contrast    Result Date: 8/26/2024  Impression: No acute fracture. Bilateral facial/scalp soft tissue swelling and subcutaneous hematomas Workstation performed: [id number]     TRAUMA - CT head wo contrast    Result Date: 8/26/2024  Impression: Acute parenchymal hematoma centered in the left cerebellum measures up to 4 cm with intraventricular extension.. Mild surrounding edema with effacement of the fourth  ventricle. Small volume of subarachnoid hemorrhage. Temporal horns mildly increased in size suggesting early hydrocephalus Stable meningioma I personally discussed this study with Armando Bliss on 8/26/2024 1:07 PM. Workstation performed: [id number]     TRAUMA - CT spine cervical wo contrast    Result Date: 8/26/2024  Impression: No acute fracture. Atlantoaxial rotatory subluxation versus positional rotation. I personally discussed this study with Armando Bliss on 8/26/2024 1:41 PM. Workstation performed: [id number]       EKG, Pathology, and Other Studies: I have personally reviewed pertinent reports.      VTE Pharmacologic Prophylaxis: Reason for no pharmacologic prophylaxis - hemorrhage    VTE Mechanical Prophylaxis: sequential compression device

## 2024-08-27 NOTE — PROGRESS NOTES
[facility]  Progress Note: Critical Care  Name: Rozina Pizano 89 y.o. adult I MRN: [id number]  Unit/Bed#: ICU 11 I Date of Admission: 8/26/2024   Date of Service: 8/27/2024 I Hospital Day: 1    Assessment & Plan   Neuro:   Diagnosis: L Cerebellar hematoma with effacement into 4th ventricle, small volume SAH; stable R meningioma; stable hydrocephalus bilateral subconjunctival hemorrhage  Agitation, analgesia  Plan: Keppra 500 mg BID  Q1h neuro checks  Per neurosurgery no aggressive management at this time, consider MRI for cause of hemorrhage if warranted  No EVD at present, as this would require intubation and likely more complications  Imaging shows increased SAH, relatively stable IPH  Continue home zoloft, 100 mg AM 50 mg qHS  BP goal of <160 systolic  Hold antiplatelet and Anticoagulant medications  Stat CT Head with any neurologic decline including drop in GCS of 2 points in 1 hr.   No pain meds ordered at this time   Discussion with family about goals of care pending neurosurgery recommendations     CV:   Diagnosis: HLD, prior CVA  Plan: Continue home lipitor 40 mg daily    Pulm:  No active issues    GI:   No active issues    :   Diagnosis: Elevated CK, downtrending  Plan: Isolyte 125 mL/hr    F/E/N:   Diagnosis: Elevated CK  Plan: Isolyte 125 mL/hr; bolus 1 L isolyte   Electrolytes, trend BMP, CK at 1600  Repleted K to 4  Nutrition: NPO    Heme/Onc:   Diagnosis: DVT PPX  Plan: SCDs    Endo:   Diagnosis: Hypothyroidism  Plan: Home synthroid 137 mcg daily     ID:   No active issues    MSK/Skin:   Diagnosis: Hand laceration, skin bruising  Plan: skin care per nursing, keep hand laceration clean and dry    Disposition: Critical care    ICU Core Measures     A: Assess, Prevent, and Manage Pain Has pain been assessed? NA  Need for changes to pain regimen? No   B: Both SAT/SAT  N/A   C: Choice of Sedation RASS Goal: -1 Drowsy or 0 Alert and Calm  Need for changes to  sedation or analgesia regimen? No   D: Delirium CAM-ICU: Unable to perform secondary to Dementia or other chronic cognitive dysfunction   E: Early Mobility  Plan for early mobility? NA   F: Family Engagement Plan for family engagement today? Yes       Review of Invasive Devices:    Jose Plan: Continue for accurate I/O monitoring for 48 hours        Prophylaxis:  VTE VTE covered by:    None       Stress Ulcer  not ordered        Significant 24hr Events     24hr events: Per nursing, Patient was less responsive to external stimuli and pain since going for CT scan this morning, q1 neuro checks otherwise unremarkable.      Subjective     Review of Systems: See HPI for Review of Systems     Objective                            Vitals I/O      Most Recent Min/Max in 24hrs   Temp 98.1 °F (36.7 °C) Temp  Min: 98 °F (36.7 °C)  Max: 99 °F (37.2 °C)   Pulse 82 Pulse  Min: 74  Max: 102   Resp 16 Resp  Min: 12  Max: 22   /80 BP  Min: 115/67  Max: 156/86   O2 Sat 97 % SpO2  Min: 95 %  Max: 98 %      Intake/Output Summary (Last 24 hours) at 8/27/2024 0701  Last data filed at 8/27/2024 0601  Gross per 24 hour   Intake 3302.5 ml   Output 1025 ml   Net 2277.5 ml       Diet NPO    Invasive Monitoring           Physical Exam   Physical Exam  Vitals and nursing note reviewed.   Skin:     General: Skin is warm and dry.      Findings: Bruising (Facial bruising, left shoulder and ankle bruising) present.   HENT:      Head:      Comments: Bilateral facial swelling and bruising present periorbitally, on bilateral cheeks, and bilateral upper lip     Mouth/Throat:      Pharynx: No oropharyngeal exudate.   Cardiovascular:      Rate and Rhythm: Normal rate and regular rhythm.      Pulses: Normal pulses.      Heart sounds: Normal heart sounds.   Abdominal:      Palpations: Abdomen is soft.      Tenderness: There is no abdominal tenderness.   Constitutional:       Appearance: Rozina is ill-appearing.   Pulmonary:      Effort: Pulmonary  effort is normal. No respiratory distress.   Neurological:      Mental Status: Rozina is somnolent.      GCS: GCS eye subscore is 1. GCS verbal subscore is 2. GCS motor subscore is 6.      Motor: Weakness (Patient with global weakness, able to squeeze left hand).   Genitourinary/Anorectal:  Jose present.          Diagnostic Studies      EKG: No new EKG data  Imaging: FINDINGS:     PARENCHYMA:     Again demonstrated is an intraparenchymal hemorrhage centered in the paramedian left cerebellum, but which crosses midline to the right, measuring approximately 4.0 cm by at least 2.6 cm in maximal axial dimension seen on series 2 image 11. There is   extension into the adjacent fourth ventricle. There is surrounding vasogenic edema. The hemorrhage appears relatively stable in size. There is stable effacement of the fourth ventricle.     There is stable subarachnoid hemorrhage, along the right parietal convexity. There is additional either subarachnoid hemorrhage or intraparenchymal hemorrhage involving the right occipital lobe, such as seen on series 2 image 18. There is trace   subarachnoid hemorrhage along the left sylvian fissure and left parietal lobe, new when compared to the prior study.     There is a large densely calcified mass, along the right parietal convexity near the vertex, which measures approximately 3.0 cm x 2.5 cm in maximal dimensions, most compatible with a meningioma. This is stable.     There is no midline shift. There are extensive white matter low-attenuation changes involving both cerebral hemispheres.     VENTRICLES AND EXTRA-AXIAL SPACES: There is intraventricular hemorrhage also noted involving the third ventricle, and the body of the right lateral ventricle as well as layering in the occipital horn of the right lateral ventricle. There is stable mild   ventriculomegaly involving the lateral and third ventricles indicative of hydrocephalus.     VISUALIZED ORBITS:  Normal visualized orbits.      PARANASAL SINUSES:  Normal visualized paranasal sinuses.     CALVARIUM AND EXTRACRANIAL SOFT TISSUES: No calvarial fracture is identified. There is a large left frontal scalp/facial hematoma and smaller parietal scalp hematoma again demonstrated.     IMPRESSION:        1. Relatively stable large acute intraparenchymal hemorrhage involving the cerebellum centered just to left of midline, with extension into the fourth ventricle. Surrounding vasogenic edema with effacement of the fourth ventricle again demonstrated.  2. Subarachnoid hemorrhage noted along the right parietal convexity, grossly unchanged. Additional subarachnoid hemorrhage or intraparenchymal hemorrhage involving the right occipital lobe grossly unchanged, and trace subarachnoid hemorrhage noted along   the left sylvian fissure and left parietal lobe, new when compared to the prior study.  3. Stable intraventricular hemorrhage also noted involving the third ventricle, and right lateral ventricle. Stable mild ventriculomegaly involving the lateral and third ventricles indicative of developing mild hydrocephalus.  4. Extensive white matter low-attenuation changes involving both cerebral hemispheres, which statistically likely represent extensive chronic microangiopathic changes.  5. Densely calcified meningioma along the right parietal lobe near the vertex.  6. Scalp hematomas as described above.       I have personally reviewed pertinent reports.   and I have personally reviewed pertinent films in PACS     Medications:  Scheduled PRN   atorvastatin, 40 mg, Daily  chlorhexidine, 15 mL, Q12H WISAM  levETIRAcetam, 500 mg, Q12H WISAM  levothyroxine, 137 mcg, Early Morning  magnesium sulfate, 2 g, Once  potassium chloride, 20 mEq, Q2H  potassium chloride, 20 mEq, Q2H  sertraline, 100 mg, Daily  sertraline, 50 mg, HS          Continuous    multi-electrolyte, 125 mL/hr, Last Rate: 125 mL/hr (08/27/24 0107)         Labs:    CBC    Recent Labs     08/26/24  1304  08/27/24  0440   WBC 16.87* 13.01*   HGB 9.3* 9.2*   HCT 27.7* 27.1*    163     BMP    Recent Labs     08/26/24  1632 08/27/24  0440   SODIUM 140 138   K 3.9 3.1*    104   CO2 26 24   AGAP 10 10   BUN 23 19   CREATININE 0.55* 0.47*   CALCIUM 9.0 8.0*       Coags    Recent Labs     08/26/24  1304   INR 1.19   PTT 28        Additional Electrolytes  Recent Labs     08/26/24  1234 08/27/24  0440   MG  --  2.3   PHOS  --  3.7   CAIONIZED 1.11*  --           Blood Gas    No recent results  No recent results LFTs  Recent Labs     08/26/24  1304   ALT 28   AST 65*   ALKPHOS 57   ALB 3.7   TBILI 1.50*       Infectious  No recent results  Glucose  Recent Labs     08/26/24  1304 08/26/24  1632 08/27/24  0440   GLUC 125 117 116               Jhonatan Flowers, MS4  [facility]

## 2024-08-27 NOTE — QUICK NOTE
Patient with worsening mental status overnight into early morning.  Repeat scan done neurosurgery was aware.  Scan was reviewed with neurosurgery.     Patients daughter arrived at the ICU and was updated immediately on her mother's worsening mental status.  I discussed the patient's poor prognosis. Patient does have a DNR which the daughter reviewed prior to coming to the hospital.  I also discussed with patient yesterday who expressed she would not want a breathing tube or surgery.  Spoke with daughter who came in from [place] yesterday and made her aware yesterday that her mother had high risk for deterioration.  Patient's daughter was appreciative that I was very honest about the expected course.  And she was glad that I told her to come yesterday.  Discussed with patient's daughter neurosurgery's recommendations as well as our recommendations.  Expressed that her mother will not get back to her baseline and will likely progress until she ultimately passes away.   Discussed that we would like to consider transitioning to level 4 comfort measures only.  Patient's daughter is in agreement to make patient comfort measures only once her brother gets here.  Patient's brother is driving in and should be here late afternoon.  Patient's daughter states that she was not particularly [employer] but does believe that having a  would help.  Patient's daughter is also requesting palliative care to discuss planning as the days go on.     was consulted as well as hospice/palliative care consulted for further planning.

## 2024-08-27 NOTE — PROGRESS NOTES
Progress Note - Trauma ICU Transfer   and Tertiary Survery   Rozina Pizano 89 y.o. unknown [id number]   Unit/Bed#: ICU 11 Encounter: [id number]     Assessment & Plan   Summary of Diagnosed Injuries: L Cerebellar hematoma with effacement into 4th ventricle, small volume SAH     PLAN:  Transfer to med/surg floor, Trauma Service, for comfort care    VTE Prophylaxis:Reason for no pharmacologic prophylaxis bleeding      Disposition: med/surg    Code status:  Level 3 - DNAR and DNI    Consultants: IP CONSULT TO NEUROSURGERY  IP CONSULT TO GERONTOLOGY  IP CONSULT TO CASE MANAGEMENT  IP CONSULT TO CASE MANAGEMENT     Subjective   Mechanism of Injury:Other: fall     HPI/Last 24 hour events: see summary of course    Reason for ICU admission: neuro-monitoring    Summary of ICU clinical course: Ms Pizano is a 89 y.o. F who presents status post fall. She has a notable history of ASD with stroke, currently on Xarelto 20 mg BID. She initially presented to hospital on 8/26 after being found down by neighbors. She had multiple facial bruises and swelling. GCS was 14 with notable confusion. CT was remarkable for L Cerebellar hematoma with effacement into 4th ventricle and small volume SAH. Repeat CT on hospital day #2 then showed slight progression of cerebellar hematoma and SAH. In the setting of declining mental status and minimal responsiveness, discussion was had with the patient's daughter who has POA regarding her poor prognosis. Decision was made to transition to comfort care later this evening when the rest of her family arrives.     Recent or scheduled procedures: none    Outstanding/pending diagnostics: none       Objective   Vitals:   Temp:  [98 °F (36.7 °C)-99 °F (37.2 °C)] 98.8 °F (37.1 °C)  HR:  [] 80  Resp:  [12-20] 15  BP: (115-155)/(67-98) 147/80    I/O         08/25 0701  08/26 0700 08/26 0701  08/27 0700 08/27 0701  08/28 0700    I.V. (mL/kg)  3302.5 (65.7) 500 (9.9)    IV Piggyback   80    Total  Intake(mL/kg)  3302.5 (65.7) 580 (11.5)    Urine (mL/kg/hr)  1025 255 (0.8)    Total Output  1025 255    Net  +2277.5 +325                    Physical Exam:   Vitals and nursing note reviewed.   Skin:     General: Skin is warm and dry.      Findings: Bruising (Facial bruising, left shoulder and ankle bruising) present.   HENT:      Head:      Comments: Bilateral facial swelling and bruising present periorbitally, on bilateral cheeks, and bilateral upper lip     Mouth/Throat:      Pharynx: No oropharyngeal exudate.   Cardiovascular:      Rate and Rhythm: Normal rate and regular rhythm.      Pulses: Normal pulses.      Heart sounds: Normal heart sounds.   Abdominal:      Palpations: Abdomen is soft.      Tenderness: There is no abdominal tenderness.   Constitutional:       Appearance: Rozina is ill-appearing.   Pulmonary:      Effort: Pulmonary effort is normal. No respiratory distress.   Neurological:      Mental Status: Rozina is somnolent.      GCS: GCS eye subscore is 1. GCS verbal subscore is 2. GCS motor subscore is 6.      Motor: Weakness (Patient with global weakness, able to squeeze left hand).   Genitourinary/Anorectal:  Jose present.     Invasive Devices       Peripheral Intravenous Line  Duration             Peripheral IV 08/26/24 Left Antecubital 1 day    Peripheral IV 08/26/24 Left;Ventral (anterior) Forearm 1 day              Drain  Duration             Urethral Catheter Non-latex 16 Fr. <1 day                   Rationale for remaining devices: n/a    1. Before the illness or injury that brought you to the Emergency, did you need someone to help you on a regular basis? 1=Yes   2. Since the illness or injury that brought you to the Emergency, have you needed more help than usual to take care of yourself? 1=Yes   3. Have you been hospitalized for one or more nights during the past 6 months (excluding a stay in the Emergency Department)? 0=No   4. In general, do you see well? 1=No   5. In general, do  you have serious problems with your memory? 0=No   6. Do you take more than three different medications everyday? 1=Yes   TOTAL   4     Did you order a geriatric consult if the score was 2 or greater?: yes       Lab Results: BMP/CMP:   Lab Results   Component Value Date    SODIUM 138 08/27/2024    K 3.1 (L) 08/27/2024     08/27/2024    CO2 24 08/27/2024    BUN 19 08/27/2024    CREATININE 0.47 (L) 08/27/2024    CALCIUM 8.0 (L) 08/27/2024    and CBC:   Lab Results   Component Value Date    WBC 13.01 (H) 08/27/2024    HGB 9.2 (L) 08/27/2024    HCT 27.1 (L) 08/27/2024     (H) 08/27/2024     08/27/2024    RBC 2.71 (L) 08/27/2024    MCH 33.9 08/27/2024    MCHC 33.9 08/27/2024    RDW 14.3 08/27/2024    MPV 9.1 08/27/2024    NRBC 0 08/27/2024       Imaging Results: I have personally reviewed pertinent reports.    Chest Xray(s): N/A   FAST exam(s): N/A   CT Scan(s): Head CT positive for cerebellar and SAH, as above   Additional Xray(s): N/A     Other Studies: n/a    Code Status: Level 3 - DNAR and DNI       Patient seen and evaluated by Critical Care today and deemed to be appropriate for transfer to Med Surg. Spoke to Yane Dominguez and Peter Kirkland from Trauma service regarding transfer. Critical Care can be contacted via [employer] with any questions or concerns.    Vera Watts

## 2024-08-27 NOTE — PLAN OF CARE
Problem: Prexisting or High Potential for Compromised Skin Integrity  Goal: Skin integrity is maintained or improved  Description: INTERVENTIONS:  - Identify patients at risk for skin breakdown  - Assess and monitor skin integrity  - Assess and monitor nutrition and hydration status  - Monitor labs   - Assess for incontinence   - Turn and reposition patient  - Assist with mobility/ambulation  - Relieve pressure over bony prominences  - Avoid friction and shearing  - Provide appropriate hygiene as needed including keeping skin clean and dry  - Evaluate need for skin moisturizer/barrier cream  - Collaborate with interdisciplinary team   - Patient/family teaching  - Consider wound care consult   Outcome: Progressing     Problem: PAIN - ADULT  Goal: Verbalizes/displays adequate comfort level or baseline comfort level  Description: Interventions:  - Encourage patient to monitor pain and request assistance  - Assess pain using appropriate pain scale  - Administer analgesics based on type and severity of pain and evaluate response  - Implement non-pharmacological measures as appropriate and evaluate response  - Consider cultural and social influences on pain and pain management  - Notify physician/advanced practitioner if interventions unsuccessful or patient reports new pain  Outcome: Progressing     Problem: INFECTION - ADULT  Goal: Absence or prevention of progression during hospitalization  Description: INTERVENTIONS:  - Assess and monitor for signs and symptoms of infection  - Monitor lab/diagnostic results  - Monitor all insertion sites, i.e. indwelling lines, tubes, and drains  - Monitor endotracheal if appropriate and nasal secretions for changes in amount and color  - [place] appropriate cooling/warming therapies per order  - Administer medications as ordered  - Instruct and encourage patient and family to use good hand hygiene technique  - Identify and instruct in appropriate isolation precautions for  identified infection/condition  Outcome: Progressing     Problem: INFECTION - ADULT  Goal: Absence of fever/infection during neutropenic period  Description: INTERVENTIONS:  - Monitor WBC    Outcome: Progressing     Problem: SAFETY ADULT  Goal: Maintain or return to baseline ADL function  Description: INTERVENTIONS:  -  Assess patient's ability to carry out ADLs; assess patient's baseline for ADL function and identify physical deficits which impact ability to perform ADLs (bathing, care of mouth/teeth, toileting, grooming, dressing, etc.)  - Assess/evaluate cause of self-care deficits   - Assess range of motion  - Assess patient's mobility; develop plan if impaired  - Assess patient's need for assistive devices and provide as appropriate  - Encourage maximum independence but intervene and supervise when necessary  - Involve family in performance of ADLs  - Assess for home care needs following discharge   - Consider OT consult to assist with ADL evaluation and planning for discharge  - Provide patient education as appropriate  Outcome: Progressing

## 2024-08-27 NOTE — PHYSICAL THERAPY NOTE
Physical Therapy Cancellation Note    PT orders received chart review completed. PT spoke to nsg and not appropriate to participate in skilled PT at this time. PT will follow and eval as medically appropriate.     08/27/24 1500   Note Type   Note type Cancelled Session   Cancel Reasons Medical status   Pain Assessment   Pain Assessment Tool 0-10   Pain Score No Pain       Sheila Durham, PT

## 2024-08-28 ENCOUNTER — HOME CARE VISIT (OUTPATIENT)
Dept: HOME HEALTH SERVICES | Facility: HOME HEALTHCARE | Age: 89
End: 2024-08-28
Payer: MEDICARE

## 2024-08-28 ENCOUNTER — HOSPICE ADMISSION (OUTPATIENT)
Dept: HOME HOSPICE | Facility: HOSPICE | Age: 89
End: 2024-08-28
Payer: MEDICARE

## 2024-08-28 PROCEDURE — 99233 SBSQ HOSP IP/OBS HIGH 50: CPT | Performed by: SURGERY

## 2024-08-28 RX ORDER — HALOPERIDOL 2 MG/ML
0.5 SOLUTION ORAL EVERY 4 HOURS PRN
Qty: 25 ML | Refills: 0 | Status: SHIPPED | OUTPATIENT
Start: 2024-08-28

## 2024-08-28 RX ORDER — LORAZEPAM 2 MG/ML
0.5 CONCENTRATE ORAL EVERY 4 HOURS PRN
Qty: 15 ML | Refills: 0 | Status: SHIPPED | OUTPATIENT
Start: 2024-08-28 | End: 2024-08-30

## 2024-08-28 RX ORDER — MORPHINE SULFATE 100 MG/5ML
6 SOLUTION, CONCENTRATE ORAL EVERY 2 HOUR PRN
Qty: 30 ML | Refills: 0 | Status: SHIPPED | OUTPATIENT
Start: 2024-08-28 | End: 2024-08-30

## 2024-08-28 RX ORDER — MORPHINE SULFATE 100 MG/5ML
6 SOLUTION, CONCENTRATE ORAL EVERY 2 HOUR PRN
Status: DISCONTINUED | OUTPATIENT
Start: 2024-08-28 | End: 2024-08-29 | Stop reason: HOSPADM

## 2024-08-28 RX ORDER — HALOPERIDOL 2 MG/ML
0.5 SOLUTION ORAL EVERY 4 HOURS PRN
Status: DISCONTINUED | OUTPATIENT
Start: 2024-08-28 | End: 2024-08-29 | Stop reason: HOSPADM

## 2024-08-28 RX ORDER — LORAZEPAM 0.5 MG/1
0.5 TABLET ORAL EVERY 4 HOURS PRN
Status: DISCONTINUED | OUTPATIENT
Start: 2024-08-28 | End: 2024-08-29 | Stop reason: HOSPADM

## 2024-08-28 RX ADMIN — Medication 6 MG: at 17:39

## 2024-08-28 RX ADMIN — WHITE PETROLATUM 57.7 %-MINERAL OIL 31.9 % EYE OINTMENT: at 14:22

## 2024-08-28 RX ADMIN — WHITE PETROLATUM 57.7 %-MINERAL OIL 31.9 % EYE OINTMENT: at 05:30

## 2024-08-28 RX ADMIN — WHITE PETROLATUM 57.7 %-MINERAL OIL 31.9 % EYE OINTMENT: at 09:25

## 2024-08-28 RX ADMIN — LORAZEPAM 0.5 MG: 0.5 TABLET ORAL at 16:19

## 2024-08-28 RX ADMIN — LORAZEPAM 0.5 MG: 0.5 TABLET ORAL at 20:17

## 2024-08-28 NOTE — CASE MANAGEMENT
Case Management Discharge Planning Note    Patient name Rozina Pizano  Location [facility] 631/[facility] 6[facility]-01 MRN [id number]  : 1934 Date 2024       Current Admission Date: 2024  Current Admission Diagnosis:ICH (intracerebral hemorrhage) (HCC)   Patient Active Problem List    Diagnosis Date Noted Date Diagnosed    ICH (intracerebral hemorrhage) (HCC) 2024       LOS (days): 2  Geometric Mean LOS (GMLOS) (days): 3.3  Days to GMLOS:1.4     OBJECTIVE:  Risk of Unplanned Readmission Score: 15.87         Current admission status: Inpatient   Preferred Pharmacy: No Pharmacies Listed  Primary Care Provider: Sarah Arnold MD    Primary Insurance: MEDICARE  Secondary Insurance: [facility] BLUE SHIELD    DISCHARGE DETAILS:    Noted that hospice consult was placed; and that pt's family was interested in discussing hospice. CM placed referral

## 2024-08-28 NOTE — CASE MANAGEMENT
Case Management Discharge Planning Note    Patient name Rozina Pizano  Location [facility] 631/[facility] 6[facility]-01 MRN [id number]  : 1934 Date 2024       Current Admission Date: 2024  Current Admission Diagnosis:ICH (intracerebral hemorrhage) (HCC)   Patient Active Problem List    Diagnosis Date Noted Date Diagnosed    ICH (intracerebral hemorrhage) (HCC) 2024       LOS (days): 2  Geometric Mean LOS (GMLOS) (days): 3.3  Days to GMLOS:1.4     OBJECTIVE:  Risk of Unplanned Readmission Score: 15.87         Current admission status: Inpatient   Preferred Pharmacy: No Pharmacies Listed  Primary Care Provider: Sarah Arnold MD    Primary Insurance: MEDICARE  Secondary Insurance: [facility] BLUE SHIELD    DISCHARGE DETAILS:    CM spoke to pt's son Ari [phone]  Their preference is home hospice as quickly as possible. They will come shortly and meet with hospice.

## 2024-08-28 NOTE — HOSPICE NOTE
Received hospice referral. I met with pt's son and daughter Francheska to discuss hospice services. Hospice benefits reviewed per Medicare guidelines and all questions answered. Dr Aguayo approved for home hospice LOC. Hospice consents reviewed and signed by her daughter. Discussed equipment need at home, equipment ordered from [facility] for delivery today. Transport is scheduled for 11am tomorrow.

## 2024-08-28 NOTE — PROGRESS NOTES
"Progress Note - Trauma   Rozina Alejandro Jerica 89 y.o. unknown [id number]   Unit/Bed#: [facility] 631-01 Encounter: [id number]     ASSESSMENT:   1.  Left cerebellar hematoma with effacement of the fourth ventricle with small volume subarachnoid    PLAN:  -Patient now on hospice  -No further workup  -Will DC tomorrow on hospice at home  -Family updated by hospice liaison  -Will send pharmacy medications today  -Discontinue Wallis as patient has continued to pull out her Wallis  -No further lab draws    Disposition: DC to home on hospice tomorrow.    Subjective   Chief Complaint: Resting in bed    Subjective: Patient on morning evaluation does not appear agitated or uncomfortable.      Objective   Vitals:   Temp:  [97.7 °F (36.5 °C)] 97.7 °F (36.5 °C)  HR:  [80-92] 82  Resp:  [17-20] 20  BP: (155-162)/() 162/101    I/O         08/26 0701  08/27 0700 08/27 0701  08/28 0700 08/28 0701  08/29 0700    P.O.  0     I.V. (mL/kg) 3302.5 (65.7) 1250 (24.9)     IV Piggyback  80     Total Intake(mL/kg) 3302.5 (65.7) 1330 (26.4)     Urine (mL/kg/hr) 1025 255 (0.2) 900 (2.8)    Total Output 1025 255 900    Net +2277.5 +1075 -900                  Physical Exam:   GENERAL APPEARANCE: NAD  NEURO: Diminished neuro exam given TBI and progression of disease, intermittently agitated  HEENT: ecchymosis noted  CV: RRR  LUNGS: Diminished at the bases  GI: No distension  : wallis   MSK: +2 pulses  SKIN: intact    Invasive Devices       Peripheral Intravenous Line  Duration             Peripheral IV 08/26/24 Left Antecubital 2 days    Peripheral IV 08/26/24 Left;Ventral (anterior) Forearm 2 days              Drain  Duration             Urethral Catheter Non-latex 16 Fr. 1 day                      Lab Results: Results: I have personally reviewed all pertinent laboratory/tests results, BMP/CMP: No results found for: \"SODIUM\", \"K\", \"CL\", \"CO2\", \"ANIONGAP\", \"BUN\", \"CREATININE\", \"GLUCOSE\", \"CALCIUM\", \"AST\", \"ALT\", \"ALKPHOS\", \"PROT\", \"BILITOT\", " "\"EGFR\", and CBC: No results found for: \"WBC\", \"HGB\", \"HCT\", \"MCV\", \"PLT\", \"ADJUSTEDWBC\", \"RBC\", \"MCH\", \"MCHC\", \"RDW\", \"MPV\", \"NRBC\"  Imaging/EKG Studies: I have personally reviewed pertinent reports.     Other Studies: no other studies     "

## 2024-08-28 NOTE — CASE MANAGEMENT
Case Management Discharge Planning Note    Patient name Rozina Pizano  Location [facility] 631/[facility] 631-01 MRN [id number]  : 1934 Date 2024       Current Admission Date: 2024  Current Admission Diagnosis:ICH (intracerebral hemorrhage) (HCC)   Patient Active Problem List    Diagnosis Date Noted Date Diagnosed    ICH (intracerebral hemorrhage) (HCC) 2024       LOS (days): 2  Geometric Mean LOS (GMLOS) (days): 3.3  Days to GMLOS:1.2     OBJECTIVE:  Risk of Unplanned Readmission Score: 15.57         Current admission status: Inpatient   Preferred Pharmacy:   [facility] - [place], [place] - [address]  [address]  [place] [address]  Phone: [phone] Fax: [phone]    Primary Care Provider: Sarah Arnold MD    Primary Insurance: MEDICARE  Secondary Insurance: [facility]    DISCHARGE DETAILS:    Pt's plan is for a d/c home tomorrow with  Hospice. CM submitted for S transport tomorrow @11am

## 2024-08-29 ENCOUNTER — HOME CARE VISIT (OUTPATIENT)
Dept: HOME HEALTH SERVICES | Facility: HOME HEALTHCARE | Age: 89
End: 2024-08-29
Payer: MEDICARE

## 2024-08-29 VITALS
HEIGHT: 63 IN | OXYGEN SATURATION: 94 % | SYSTOLIC BLOOD PRESSURE: 162 MMHG | DIASTOLIC BLOOD PRESSURE: 101 MMHG | BODY MASS INDEX: 19.65 KG/M2 | HEART RATE: 89 BPM | RESPIRATION RATE: 16 BRPM | WEIGHT: 110.89 LBS | TEMPERATURE: 97.7 F

## 2024-08-29 VITALS
WEIGHT: 131 LBS | TEMPERATURE: 98.6 F | HEIGHT: 63 IN | SYSTOLIC BLOOD PRESSURE: 160 MMHG | DIASTOLIC BLOOD PRESSURE: 82 MMHG | BODY MASS INDEX: 23.21 KG/M2 | HEART RATE: 98 BPM | RESPIRATION RATE: 16 BRPM

## 2024-08-29 PROCEDURE — G0299 HHS/HOSPICE OF RN EA 15 MIN: HCPCS

## 2024-08-29 PROCEDURE — 10330057 MEDICATION, GENERAL

## 2024-08-29 PROCEDURE — 99238 HOSP IP/OBS DSCHRG MGMT 30/<: CPT | Performed by: PHYSICIAN ASSISTANT

## 2024-08-29 PROCEDURE — NC001 PR NO CHARGE: Performed by: PHYSICIAN ASSISTANT

## 2024-08-29 RX ADMIN — Medication 6 MG: at 10:11

## 2024-08-29 RX ADMIN — WHITE PETROLATUM 57.7 %-MINERAL OIL 31.9 % EYE OINTMENT 1 APPLICATION: at 10:11

## 2024-08-29 NOTE — PROGRESS NOTES
"Progress Note - Trauma   Rozina Pizano 89 y.o. unknown [id number]   Unit/Bed#: [facility] 631-01 Encounter: [id number]     ASSESSMENT/Plan:  1.  Left cerebellar hematoma with effacement of the fourth ventricle with fall small volume subarachnoid hemorrhage    -Patient has been transition to level for comfort care  -Will plan to discharge home on hospice on 8/29/2024  -Medications confirmed to be down to pharmacy  -Patient enrolled on hospice  -Out-of-hospital DNR completed yesterday on 8/28/2024  -Family in agreement with plan at this time  -Patient stable for dispo today    Bowel Regimen: Deferred    Disposition: DC home on hospice.    Subjective   Chief Complaint: Resting in bed    Subjective: No complaints     Objective   Vitals:   HR:  [80-89] 89  Resp:  [16-22] 16    I/O         08/27 0701  08/28 0700 08/28 0701  08/29 0700 08/29 0701  08/30 0700    P.O. 0 100     I.V. (mL/kg) 1250 (24.9)      IV Piggyback 80      Total Intake(mL/kg) 1330 (26.4) 100 (2)     Urine (mL/kg/hr) 255 (0.2) 1100 (0.9)     Total Output 255 1100     Net +1075 -1000                   Physical Exam:   GENERAL APPEARANCE: No acute distress  NEURO: Depressed mental status in the setting of traumatic brain injury  HEENT: Ecchymosis noted on right side face  CV: Regular rate and rhythm  LUNGS: Diminished breath sounds bilaterally  GI: No distention  : Deferred  MSK: +2 pulses  SKIN: intact    Invasive Devices       None                     Lab Results: Results: I have personally reviewed all pertinent laboratory/tests results, BMP/CMP: No results found for: \"SODIUM\", \"K\", \"CL\", \"CO2\", \"ANIONGAP\", \"BUN\", \"CREATININE\", \"GLUCOSE\", \"CALCIUM\", \"AST\", \"ALT\", \"ALKPHOS\", \"PROT\", \"BILITOT\", \"EGFR\", and CBC: No results found for: \"WBC\", \"HGB\", \"HCT\", \"MCV\", \"PLT\", \"ADJUSTEDWBC\", \"RBC\", \"MCH\", \"MCHC\", \"RDW\", \"MPV\", \"NRBC\"  Imaging/EKG Studies: I have personally reviewed pertinent reports.     Other Studies: no other studies     "

## 2024-08-29 NOTE — PLAN OF CARE
Problem: SAFETY ADULT  Goal: Patient will remain free of falls  Description: INTERVENTIONS:  - Educate patient/family on patient safety including physical limitations  - Instruct patient to call for assistance with activity   - Consult OT/PT to assist with strengthening/mobility   - Keep Call bell within reach  - Keep bed low and locked with side rails adjusted as appropriate  - Keep care items and personal belongings within reach  - Initiate and maintain comfort rounds  - Make Fall Risk Sign visible to staff  - Offer Toileting   Problem: GENITOURINARY - ADULT  Goal: Absence of urinary retention  Description: INTERVENTIONS:  - Assess patient’s ability to void and empty bladder  - Monitor I/O  - Bladder scan as needed  - Discuss with physician/AP medications to alleviate retention as needed  - Discuss catheterization for long term situations as appropriate  Outcome: Progressing     Problem: DECISION MAKING  Goal: Pt/Family able to effectively weigh alternatives and participate in decision making related to treatment and care  Description: INTERVENTIONS:  - Identify decision maker  - Determine when there are differences among patient's view, family's view, and healthcare provider's view of patient condition and care goals  - Facilitate patient/family articulation of goals for care  - Help patient/family identify pros/cons of alternative solutions  - Provide information as requested by patient/family  - Respect patient/family rights related to privacy and sharing information   - Serve as a liaison between patient, family and health care team  - Initiate consults as appropriate (Ethics Team, Palliative Care, Family Care Conference, etc.)  Outcome: Progressing     Problem: BEHAVIOR  Goal: Pt/Family maintain appropriate behavior and adhere to behavioral management agreement, if implemented  Description: INTERVENTIONS:  - Assess the family dynamic   - Encourage verbalization of thoughts and concerns in a socially  appropriate manner  - Assess patient/family's coping skills and non-compliant behavior (including use of illegal substances).  - Utilize positive, consistent limit setting strategies supporting safety of patient, staff and others  - Initiate consult with Case Management, Spiritual Care or other ancillary services as appropriate  - If a patient's/visitor's behavior jeopardizes the safety of the patient, staff, or others, refer to organization procedure.   - Notify Security of behavior or suspected illegal substances which indicate the need for search of the patient and/or belongings  - Encourage participation in the decision making process about a behavioral management agreement; implement if patient meets criteria  Outcome: Progressing     - Apply yellow socks and bracelet for high fall risk patients  - Consider moving patient to room near nurses station  Outcome: Progressing

## 2024-08-29 NOTE — PLAN OF CARE
Problem: Prexisting or High Potential for Compromised Skin Integrity  Goal: Skin integrity is maintained or improved  Description: INTERVENTIONS:  - Identify patients at risk for skin breakdown  - Assess and monitor skin integrity  - Assess and monitor nutrition and hydration status  - Monitor labs   - Assess for incontinence   - Turn and reposition patient  - Assist with mobility/ambulation  - Relieve pressure over bony prominences  - Avoid friction and shearing  - Provide appropriate hygiene as needed including keeping skin clean and dry  - Evaluate need for skin moisturizer/barrier cream  - Collaborate with interdisciplinary team   - Patient/family teaching  - Consider wound care consult   Outcome: Adequate for Discharge     Problem: PAIN - ADULT  Goal: Verbalizes/displays adequate comfort level or baseline comfort level  Description: Interventions:  - Encourage patient to monitor pain and request assistance  - Assess pain using appropriate pain scale  - Administer analgesics based on type and severity of pain and evaluate response  - Implement non-pharmacological measures as appropriate and evaluate response  - Consider cultural and social influences on pain and pain management  - Notify physician/advanced practitioner if interventions unsuccessful or patient reports new pain  Outcome: Adequate for Discharge     Problem: INFECTION - ADULT  Goal: Absence or prevention of progression during hospitalization  Description: INTERVENTIONS:  - Assess and monitor for signs and symptoms of infection  - Monitor lab/diagnostic results  - Monitor all insertion sites, i.e. indwelling lines, tubes, and drains  - Monitor endotracheal if appropriate and nasal secretions for changes in amount and color  - [place] appropriate cooling/warming therapies per order  - Administer medications as ordered  - Instruct and encourage patient and family to use good hand hygiene technique  - Identify and instruct in appropriate isolation  precautions for identified infection/condition  Outcome: Adequate for Discharge  Goal: Absence of fever/infection during neutropenic period  Description: INTERVENTIONS:  - Monitor WBC    Outcome: Adequate for Discharge     Problem: SAFETY ADULT  Goal: Patient will remain free of falls  Description: INTERVENTIONS:  - Educate patient/family on patient safety including physical limitations  - Instruct patient to call for assistance with activity   - Consult OT/PT to assist with strengthening/mobility   - Keep Call bell within reach  - Keep bed low and locked with side rails adjusted as appropriate  - Keep care items and personal belongings within reach  - Initiate and maintain comfort rounds  - Make Fall Risk Sign visible to staff  - Offer Toileting every 1 Hours, in advance of need  - Initiate/Maintain alarm  - Obtain necessary fall risk management equipment  - Apply yellow socks and bracelet for high fall risk patients  - Consider moving patient to room near nurses station  Outcome: Adequate for Discharge  Goal: Maintain or return to baseline ADL function  Description: INTERVENTIONS:  -  Assess patient's ability to carry out ADLs; assess patient's baseline for ADL function and identify physical deficits which impact ability to perform ADLs (bathing, care of mouth/teeth, toileting, grooming, dressing, etc.)  - Assess/evaluate cause of self-care deficits   - Assess range of motion  - Assess patient's mobility; develop plan if impaired  - Assess patient's need for assistive devices and provide as appropriate  - Encourage maximum independence but intervene and supervise when necessary  - Involve family in performance of ADLs  - Assess for home care needs following discharge   - Consider OT consult to assist with ADL evaluation and planning for discharge  - Provide patient education as appropriate  Outcome: Adequate for Discharge  Goal: Maintains/Returns to pre admission functional level  Description: INTERVENTIONS:  -  Perform AM-PAC 6 Click Basic Mobility/ Daily Activity assessment daily.  - Set and communicate daily mobility goal to care team and patient/family/caregiver.   - Collaborate with rehabilitation services on mobility goals if consulted  - Perform Range of Motion 3 times a day.  - Reposition patient every 2 hours.  - Dangle patient 3 times a day  - Stand patient 3 times a day  - Ambulate patient 3 times a day  - Out of bed to chair 3 times a day   - Out of bed for meals 3 times a day  - Out of bed for toileting  - Record patient progress and toleration of activity level   Outcome: Adequate for Discharge     Problem: DISCHARGE PLANNING  Goal: Discharge to home or other facility with appropriate resources  Description: INTERVENTIONS:  - Identify barriers to discharge w/patient and caregiver  - Arrange for needed discharge resources and transportation as appropriate  - Identify discharge learning needs (meds, wound care, etc.)  - Arrange for interpretive services to assist at discharge as needed  - Refer to Case Management Department for coordinating discharge planning if the patient needs post-hospital services based on physician/advanced practitioner order or complex needs related to functional status, cognitive ability, or social support system  Outcome: Adequate for Discharge     Problem: Knowledge Deficit  Goal: Patient/family/caregiver demonstrates understanding of disease process, treatment plan, medications, and discharge instructions  Description: Complete learning assessment and assess knowledge base.  Interventions:  - Provide teaching at level of understanding  - Provide teaching via preferred learning methods  Outcome: Adequate for Discharge     Problem: NEUROSENSORY - ADULT  Goal: Achieves stable or improved neurological status  Description: INTERVENTIONS  - Monitor and report changes in neurological status  - Monitor vital signs such as temperature, blood pressure, glucose, and any other labs ordered   -  Initiate measures to prevent increased intracranial pressure  - Monitor for seizure activity and implement precautions if appropriate      Outcome: Adequate for Discharge  Goal: Remains free of injury related to seizures activity  Description: INTERVENTIONS  - Maintain airway, patient safety  and administer oxygen as ordered  - Monitor patient for seizure activity, document and report duration and description of seizure to physician/advanced practitioner  - If seizure occurs,  ensure patient safety during seizure  - Reorient patient post seizure  - Seizure pads on all 4 side rails  - Instruct patient/family to notify RN of any seizure activity including if an aura is experienced  - Instruct patient/family to call for assistance with activity based on nursing assessment  - Administer anti-seizure medications if ordered    Outcome: Adequate for Discharge  Goal: Achieves maximal functionality and self care  Description: INTERVENTIONS  - Monitor swallowing and airway patency with patient fatigue and changes in neurological status  - Encourage and assist patient to increase activity and self care.   - Encourage visually impaired, hearing impaired and aphasic patients to use assistive/communication devices  Outcome: Adequate for Discharge     Problem: CARDIOVASCULAR - ADULT  Goal: Maintains optimal cardiac output and hemodynamic stability  Description: INTERVENTIONS:  - Monitor I/O, vital signs and rhythm  - Monitor for S/S and trends of decreased cardiac output  - Administer and titrate ordered vasoactive medications to optimize hemodynamic stability  - Assess quality of pulses, skin color and temperature  - Assess for signs of decreased coronary artery perfusion  - Instruct patient to report change in severity of symptoms  Outcome: Adequate for Discharge  Goal: Absence of cardiac dysrhythmias or at baseline rhythm  Description: INTERVENTIONS:  - Continuous cardiac monitoring, vital signs, obtain 12 lead EKG if  ordered  - Administer antiarrhythmic and heart rate control medications as ordered  - Monitor electrolytes and administer replacement therapy as ordered  Outcome: Adequate for Discharge     Problem: RESPIRATORY - ADULT  Goal: Achieves optimal ventilation and oxygenation  Description: INTERVENTIONS:  - Assess for changes in respiratory status  - Assess for changes in mentation and behavior  - Position to facilitate oxygenation and minimize respiratory effort  - Oxygen administered by appropriate delivery if ordered  - Initiate smoking cessation education as indicated  - Encourage broncho-pulmonary hygiene including cough, deep breathe, Incentive Spirometry  - Assess the need for suctioning and aspirate as needed  - Assess and instruct to report SOB or any respiratory difficulty  - Respiratory Therapy support as indicated  Outcome: Adequate for Discharge     Problem: GASTROINTESTINAL - ADULT  Goal: Minimal or absence of nausea and/or vomiting  Description: INTERVENTIONS:  - Administer IV fluids if ordered to ensure adequate hydration  - Maintain NPO status until nausea and vomiting are resolved  - Nasogastric tube if ordered  - Administer ordered antiemetic medications as needed  - Provide nonpharmacologic comfort measures as appropriate  - Advance diet as tolerated, if ordered  - Consider nutrition services referral to assist patient with adequate nutrition and appropriate food choices  Outcome: Adequate for Discharge  Goal: Maintains or returns to baseline bowel function  Description: INTERVENTIONS:  - Assess bowel function  - Encourage oral fluids to ensure adequate hydration  - Administer IV fluids if ordered to ensure adequate hydration  - Administer ordered medications as needed  - Encourage mobilization and activity  - Consider nutritional services referral to assist patient with adequate nutrition and appropriate food choices  Outcome: Adequate for Discharge  Goal: Maintains adequate nutritional  intake  Description: INTERVENTIONS:  - Monitor percentage of each meal consumed  - Identify factors contributing to decreased intake, treat as appropriate  - Assist with meals as needed  - Monitor I&O, weight, and lab values if indicated  - Obtain nutrition services referral as needed  Outcome: Adequate for Discharge  Goal: Establish and maintain optimal ostomy function  Description: INTERVENTIONS:  - Assess bowel function  - Encourage oral fluids to ensure adequate hydration  - Administer IV fluids if ordered to ensure adequate hydration   - Administer ordered medications as needed  - Encourage mobilization and activity  - Nutrition services referral to assist patient with appropriate food choices  - Assess stoma site  - Consider wound care consult   Outcome: Adequate for Discharge  Goal: Oral mucous membranes remain intact  Description: INTERVENTIONS  - Assess oral mucosa and hygiene practices  - Implement preventative oral hygiene regimen  - Implement oral medicated treatments as ordered  - Initiate Nutrition services referral as needed  Outcome: Adequate for Discharge     Problem: GENITOURINARY - ADULT  Goal: Maintains or returns to baseline urinary function  Description: INTERVENTIONS:  - Assess urinary function  - Encourage oral fluids to ensure adequate hydration if ordered  - Administer IV fluids as ordered to ensure adequate hydration  - Administer ordered medications as needed  - Offer frequent toileting  - Follow urinary retention protocol if ordered  Outcome: Adequate for Discharge  Goal: Absence of urinary retention  Description: INTERVENTIONS:  - Assess patient’s ability to void and empty bladder  - Monitor I/O  - Bladder scan as needed  - Discuss with physician/AP medications to alleviate retention as needed  - Discuss catheterization for long term situations as appropriate  Outcome: Adequate for Discharge  Goal: Urinary catheter remains patent  Description: INTERVENTIONS:  - Assess patency of urinary  catheter  - If patient has a chronic wallis, consider changing catheter if non-functioning  - Follow guidelines for intermittent irrigation of non-functioning urinary catheter  Outcome: Adequate for Discharge     Problem: METABOLIC, FLUID AND ELECTROLYTES - ADULT  Goal: Electrolytes maintained within normal limits  Description: INTERVENTIONS:  - Monitor labs and assess patient for signs and symptoms of electrolyte imbalances  - Administer electrolyte replacement as ordered  - Monitor response to electrolyte replacements, including repeat lab results as appropriate  - Instruct patient on fluid and nutrition as appropriate  Outcome: Adequate for Discharge  Goal: Fluid balance maintained  Description: INTERVENTIONS:  - Monitor labs   - Monitor I/O and WT  - Instruct patient on fluid and nutrition as appropriate  - Assess for signs & symptoms of volume excess or deficit  Outcome: Adequate for Discharge  Goal: Glucose maintained within target range  Description: INTERVENTIONS:  - Monitor Blood Glucose as ordered  - Assess for signs and symptoms of hyperglycemia and hypoglycemia  - Administer ordered medications to maintain glucose within target range  - Assess nutritional intake and initiate nutrition service referral as needed  Outcome: Adequate for Discharge     Problem: SKIN/TISSUE INTEGRITY - ADULT  Goal: Skin Integrity remains intact(Skin Breakdown Prevention)  Description: Assess:  -Perform Donato assessment every shift  -Clean and moisturize skin every day  -Inspect skin when repositioning, toileting, and assisting with ADLS  -Assess under medical devices such as lines every 2 hours  -Assess extremities for adequate circulation and sensation     Bed Management:  -Have minimal linens on bed & keep smooth, unwrinkled  -Change linens as needed when moist or perspiring  -Avoid sitting or lying in one position for more than 2 hours while in bed  -Keep HOB at 30 degrees     Toileting:  -Offer bedside commode  -Assess for  incontinence  -Use incontinent care products after each incontinent episode    Activity:  -Mobilize patient 3 times a day  -Encourage activity and walks on unit  -Encourage or provide ROM exercises   -Turn and reposition patient every 2 Hours  -Use appropriate equipment to lift or move patient in bed  -Instruct/ Assist with weight shifting every 15 min when out of bed in chair  -Consider limitation of chair time 2 hour intervals    Skin Care:  -Avoid use of baby powder, tape, friction and shearing, hot water or constrictive clothing  -Relieve pressure over bony prominences  -Do not massage red bony areas    Next Steps:  -Teach patient strategies to minimize risks    -Consider consults to  interdisciplinary teams  Outcome: Adequate for Discharge  Goal: Incision(s), wounds(s) or drain site(s) healing without S/S of infection  Description: INTERVENTIONS  - Assess and document dressing, incision, wound bed, drain sites and surrounding tissue  - Provide patient and family education  - Perform skin care/dressing changes  Outcome: Adequate for Discharge  Goal: Pressure injury heals and does not worsen  Description: Interventions:  - Implement low air loss mattress or specialty surface (Criteria met)  - Apply silicone foam dressing  - Instruct/assist with weight shifting every 15 minutes when in chair   - Limit chair time to 2 hour intervals  - Use special pressure reducing interventions such as softcare cushions when in chair   - Apply fecal or urinary incontinence containment device   - Perform passive or active ROM   - Turn and reposition patient & offload bony prominences every 2 hours   - Utilize friction reducing device or surface for transfers   - Consider consults to  interdisciplinary teams   - Use incontinent care products after each incontinent episode  - Consider nutrition services referral as needed  Outcome: Adequate for Discharge     Problem: HEMATOLOGIC - ADULT  Goal: Maintains hematologic  stability  Description: INTERVENTIONS  - Assess for signs and symptoms of bleeding or hemorrhage  - Monitor labs  - Administer supportive blood products/factors as ordered and appropriate  Outcome: Adequate for Discharge     Problem: MUSCULOSKELETAL - ADULT  Goal: Maintain or return mobility to safest level of function  Description: INTERVENTIONS:  - Assess patient's ability to carry out ADLs; assess patient's baseline for ADL function and identify physical deficits which impact ability to perform ADLs (bathing, care of mouth/teeth, toileting, grooming, dressing, etc.)  - Assess/evaluate cause of self-care deficits   - Assess range of motion  - Assess patient's mobility  - Assess patient's need for assistive devices and provide as appropriate  - Encourage maximum independence but intervene and supervise when necessary  - Involve family in performance of ADLs  - Assess for home care needs following discharge   - Consider OT consult to assist with ADL evaluation and planning for discharge  - Provide patient education as appropriate  Outcome: Adequate for Discharge  Goal: Maintain proper alignment of affected body part  Description: INTERVENTIONS:  - Support, maintain and protect limb and body alignment  - Provide patient/ family with appropriate education  Outcome: Adequate for Discharge     Problem: Potential for Falls  Goal: Patient will remain free of falls  Description: INTERVENTIONS:  - Educate patient/family on patient safety including physical limitations  - Instruct patient to call for assistance with activity   - Consult OT/PT to assist with strengthening/mobility   - Keep Call bell within reach  - Keep bed low and locked with side rails adjusted as appropriate  - Keep care items and personal belongings within reach  - Initiate and maintain comfort rounds  - Make Fall Risk Sign visible to staff  - Offer Toileting every 2 Hours, in advance of need  - Initiate/Maintain alarm  - Obtain necessary fall risk  management equipment  - Apply yellow socks and bracelet for high fall risk patients  - Consider moving patient to room near nurses station  Outcome: Adequate for Discharge     Problem: SAFETY,RESTRAINT: NV/NON-SELF DESTRUCTIVE BEHAVIOR  Goal: Remains free of harm/injury (restraint for non violent/non self-detsructive behavior)  Description: INTERVENTIONS:  - Instruct patient/family regarding restraint use   - Assess and monitor physiologic and psychological status   - Provide interventions and comfort measures to meet assessed patient needs   - Identify and implement measures to help patient regain control  - Assess readiness for release of restraint   Outcome: Adequate for Discharge  Goal: Returns to optimal restraint-free functioning  Description: INTERVENTIONS:  - Assess the patient's behavior and symptoms that indicate continued need for restraint  - Identify and implement measures to help patient regain control  - Assess readiness for release of restraint   Outcome: Adequate for Discharge     Problem: PHYSICAL THERAPY ADULT  Goal: Performs mobility at highest level of function for planned discharge setting.  See evaluation for individualized goals.  Description:            See flowsheet documentation for full assessment, interventions and recommendations.  Outcome: Adequate for Discharge     Problem: OCCUPATIONAL THERAPY ADULT  Goal: Performs self-care activities at highest level of function for planned discharge setting.  See evaluation for individualized goals.  Description:            See flowsheet documentation for full assessment, interventions and recommendations.   Outcome: Adequate for Discharge     Problem: SLP ADULT - THOUGHT PROCESS, DISTURBED  Goal: Initial thought process eval performed  Outcome: Adequate for Discharge  Goal: Demonstrates cognitive skills at highest level of function for planned discharge setting.   See evaluation for individualized goals.    Outcome: Adequate for Discharge      Problem: COPING  Goal: Pt/Family able to verbalize concerns and demonstrate effective coping strategies  Description: INTERVENTIONS:  - Assist patient/family to identify coping skills, available support systems and cultural and spiritual values  - Provide emotional support, including active listening and acknowledgement of concerns of patient and caregivers  - Reduce environmental stimuli, as able  - Provide patient education  - Assess for spiritual pain/suffering and initiate spiritual care, including notification of Pastoral Care or joana based community as needed  - Assess effectiveness of coping strategies  Outcome: Adequate for Discharge  Goal: Will report anxiety at manageable levels  Description: INTERVENTIONS:  - Administer medication as ordered  - Teach and encourage coping skills  - Provide emotional support  - Assess patient/family for anxiety and ability to cope  Outcome: Adequate for Discharge     Problem: CHANGE IN BODY IMAGE  Goal: Pt/Family communicate acceptance of loss or change in body image and expresses psychological comfort and peace  Description: INTERVENTIONS:  - Assess patient/family anxiety and grief process related to change in body image, loss of functional status and loss of sense of self  - Assess patient/family's coping skills and provide emotional, spiritual and psychosocial support  - Provide information about the patient's health status with consideration of family and cultural values  - Communicate willingness to discuss loss and facilitate grief process with patient/family as appropriate  - Emphasize sustaining relationships within family system and community, or joana/spiritual traditions  - Refer to community support groups as appropriate  - Initiate Spiritual Care, Pastoral care or other ancillary consults as needed  Outcome: Adequate for Discharge     Problem: DECISION MAKING  Goal: Pt/Family able to effectively weigh alternatives and participate in decision making related  to treatment and care  Description: INTERVENTIONS:  - Identify decision maker  - Determine when there are differences among patient's view, family's view, and healthcare provider's view of patient condition and care goals  - Facilitate patient/family articulation of goals for care  - Help patient/family identify pros/cons of alternative solutions  - Provide information as requested by patient/family  - Respect patient/family rights related to privacy and sharing information   - Serve as a liaison between patient, family and health care team  - Initiate consults as appropriate (Ethics Team, Palliative Care, Family Care Conference, etc.)  Outcome: Adequate for Discharge     Problem: CONFUSION/THOUGHT DISTURBANCE  Goal: Thought disturbances (confusion, delirium, depression, dementia or psychosis) are managed to maintain or return to baseline mental status and functional level  Description: INTERVENTIONS:  - Assess for possible contributors to  thought disturbance, including but not limited to medications, infection, impaired vision or hearing, underlying metabolic abnormalities, dehydration, respiratory compromise,  psychiatric diagnoses and notify attending PHYSICAN/AP  - Monitor and intervene to maintain adequate nutrition, hydration, elimination, sleep and activity  - Decrease environmental stimuli, including noise as appropriate.  - Provide frequent contacts to provide refocusing, direction and reassurance as needed. Approach patient calmly with eye contact and at their level.  - [place] high risk fall precautions, aspiration precautions and other safety measures, as indicated  - If delirium suspected, notify physician/AP of change in condition and request immediate in-person evaluation  - Pursue consults as appropriate including Geriatric (campus dependent), OT for cognitive evaluation/activity planning, psychiatric, pastoral care, etc.  Outcome: Adequate for Discharge     Problem: BEHAVIOR  Goal: Pt/Family  maintain appropriate behavior and adhere to behavioral management agreement, if implemented  Description: INTERVENTIONS:  - Assess the family dynamic   - Encourage verbalization of thoughts and concerns in a socially appropriate manner  - Assess patient/family's coping skills and non-compliant behavior (including use of illegal substances).  - Utilize positive, consistent limit setting strategies supporting safety of patient, staff and others  - Initiate consult with Case Management, Spiritual Care or other ancillary services as appropriate  - If a patient's/visitor's behavior jeopardizes the safety of the patient, staff, or others, refer to organization procedure.   - Notify Security of behavior or suspected illegal substances which indicate the need for search of the patient and/or belongings  - Encourage participation in the decision making process about a behavioral management agreement; implement if patient meets criteria  Outcome: Adequate for Discharge

## 2024-08-29 NOTE — DISCHARGE SUMMARY
"  Discharge Summary - Rozina Pizano 89 y.o. adult MRN: [id number]    [facility] Encounter: [id number]    Admission Date:   Admission Orders (From admission, onward)       Ordered        08/26/24 1253  Inpatient Admission  Once                            Admitting Diagnosis: Brain bleed (HCC) [I61.9]  Laceration of dorsum of hand [S61.419A]  Injury, unspecified, initial encounter [T14.90XA]    HPI: Per Wolfgang Carrillo, \"Rozina Pizano is a 89 y.o. adult with a hx of previous CVA, falls, HTN, headaches who presents via EMS found down in her apartment by neighbors, with facial bruising and blood surrounding the area. JAME is currently unknown, fall vs medical bleed. Of note, she takes Xaralto with her last dose yesterday. Her last known normal was 5 pm yesterday 8/25 via the daughter.\"    Procedures Performed:   Orders Placed This Encounter   Procedures    Fast Ultrasound    Laceration repair       Summary of Hospital Course: Patient is a 89-year-old female comes in for an evaluation status post fall.  Patient had an extensive traumatic brain injury and had a laceration.  Ultimately required a stat neurosurgical consult.  Ultimately the patient was transition to hospice during her course.  Patient was transitioned out of the ICU and was discharged home on hospice per family request on 8/29/2024.  Neurosurgery signed off.  Case management assisted with disposition.    Significant Findings, Care, Treatment and Services Provided:   CT head wo contrast    Result Date: 8/27/2024  Impression: 1. Relatively stable large acute intraparenchymal hemorrhage involving the cerebellum centered just to left of midline, with extension into the fourth ventricle. Surrounding vasogenic edema with effacement of the fourth ventricle again demonstrated. 2. Subarachnoid hemorrhage noted along the right parietal convexity, grossly unchanged. Additional subarachnoid hemorrhage or intraparenchymal hemorrhage involving the " right occipital lobe grossly unchanged, and trace subarachnoid hemorrhage noted along the left sylvian fissure and left parietal lobe, new when compared to the prior study. 3. Stable intraventricular hemorrhage also noted involving the third ventricle, and right lateral ventricle. Stable mild ventriculomegaly involving the lateral and third ventricles indicative of developing mild hydrocephalus. 4. Extensive white matter low-attenuation changes involving both cerebral hemispheres, which statistically likely represent extensive chronic microangiopathic changes. 5. Densely calcified meningioma along the right parietal lobe near the vertex. 6. Scalp hematomas as described above. The study was marked in EPIC for immediate notification. Workstation performed: [id number]     TRAUMA - CT chest abdomen pelvis w contrast    Result Date: 8/26/2024  Impression: No acute intrathoracic or intra-abdominal pathology. I personally discussed this study with Armando Bliss on 8/26/2024 1:41 PM. Workstation performed: [id number]     XR Trauma multiple ([facility] trauma bay ONLY)    Result Date: 8/26/2024  Impression: No acute cardiopulmonary disease within limitations of supine imaging. Computerized Assisted Algorithm (CAA) may have been used to analyze all applicable images. Workstation performed: [id number]     XR chest 1 view    Result Date: 8/26/2024  Impression: No acute cardiopulmonary disease within limitations of supine imaging. Computerized Assisted Algorithm (CAA) may have been used to analyze all applicable images. 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 No other intracranial stenosis, large vessel occlusion or aneurysm. No active contrast extravasation into cerebellar hematoma described on  concurrent head CT. Workstation performed: [id number]     TRAUMA - CT facial bones wo contrast    Result Date: 8/26/2024  Impression: No acute fracture. Bilateral facial/scalp soft tissue swelling and subcutaneous hematomas Workstation performed: [id number]     TRAUMA - CT head wo contrast    Result Date: 8/26/2024  Impression: Acute parenchymal hematoma centered in the left cerebellum measures up to 4 cm with intraventricular extension.. Mild surrounding edema with effacement of the fourth ventricle. Small volume of subarachnoid hemorrhage. Temporal horns mildly increased in size suggesting early hydrocephalus Stable meningioma I personally discussed this study with Armando Bliss on 8/26/2024 1:07 PM. Workstation performed: [id number]     TRAUMA - CT spine cervical wo contrast    Result Date: 8/26/2024  Impression: No acute fracture. Atlantoaxial rotatory subluxation versus positional rotation. I personally discussed this study with Armando Bliss on 8/26/2024 1:41 PM. Workstation performed: [id number]        Complications: transitioned to hospice    Discharge Diagnosis:   Patient Active Problem List   Diagnosis    ICH (intracerebral hemorrhage) (HCC)         Medical Problems        Condition at Discharge:  hospice          Discharge instructions/Information to patient and family:   See after visit summary for information provided to patient and family.      Provisions for Follow-Up Care:  See after visit summary for information related to follow-up care and any pertinent home health orders.      PCP: Sarah Arnold MD    Disposition: Home    Planned Readmission: No      Discharge Statement   I spent 22 minutes discharging the patient. This time was spent on the day of discharge. I had direct contact with the patient on the day of discharge. Additional documentation is required if more than 30 minutes were spent on discharge.     Discharge Medications:  See after visit summary for reconciled discharge medications  provided to patient and family.

## 2024-08-29 NOTE — CASE MANAGEMENT
Case Management Discharge Planning Note    Patient name Rozina Pizano  Location [facility] MRN [id number]  : 1934 Date 2024       Current Admission Date: 2024  Current Admission Diagnosis:ICH (intracerebral hemorrhage) (HCC)   Patient Active Problem List    Diagnosis Date Noted Date Diagnosed    ICH (intracerebral hemorrhage) (HCC) 2024       LOS (days): 3  Geometric Mean LOS (GMLOS) (days): 3.3  Days to GMLOS:0.4     OBJECTIVE:  Risk of Unplanned Readmission Score: 15.25         Current admission status: Inpatient   Preferred Pharmacy:   [facility] - [place], [place] - [address]  [address]  [place] [address]  Phone: [phone] Fax: [phone]    Primary Care Provider: Sarah Arnold MD    Primary Insurance: MEDICARE  Secondary Insurance: [facility]    DISCHARGE DETAILS:    Plan for d/c home today with  Hospice. Pt will be transported @11am  Hospice and family aware

## 2024-08-30 ENCOUNTER — HOME CARE VISIT (OUTPATIENT)
Dept: HOME HOSPICE | Facility: HOSPICE | Age: 89
End: 2024-08-30
Payer: MEDICARE

## 2024-08-30 ENCOUNTER — HOME CARE VISIT (OUTPATIENT)
Dept: HOME HEALTH SERVICES | Facility: HOME HEALTHCARE | Age: 89
End: 2024-08-30
Payer: MEDICARE

## 2024-08-30 VITALS — DIASTOLIC BLOOD PRESSURE: 100 MMHG | SYSTOLIC BLOOD PRESSURE: 180 MMHG | HEART RATE: 92 BPM | RESPIRATION RATE: 20 BRPM

## 2024-08-30 PROCEDURE — 10330064 UNDERPAD, REUSE 36X36 1DZ     BECKCL

## 2024-08-30 PROCEDURE — G0299 HHS/HOSPICE OF RN EA 15 MIN: HCPCS

## 2024-08-30 PROCEDURE — 10330064 BRIEF, WINGS CHOICE+ QUILTED LG (18/BG 4

## 2024-08-30 RX ADMIN — Medication 10 MG: at 10:30

## 2024-08-31 ENCOUNTER — HOME CARE VISIT (OUTPATIENT)
Dept: HOME HOSPICE | Facility: HOSPICE | Age: 89
End: 2024-08-31
Payer: MEDICARE

## 2024-09-01 ENCOUNTER — HOME CARE VISIT (OUTPATIENT)
Dept: HOME HOSPICE | Facility: HOSPICE | Age: 89
End: 2024-09-01
Payer: MEDICARE

## 2024-09-03 ENCOUNTER — HOME CARE VISIT (OUTPATIENT)
Dept: HOME HEALTH SERVICES | Facility: HOME HEALTHCARE | Age: 89
End: 2024-09-03
Payer: MEDICARE

## 2024-09-03 VITALS — HEART RATE: 88 BPM | DIASTOLIC BLOOD PRESSURE: 70 MMHG | SYSTOLIC BLOOD PRESSURE: 184 MMHG | RESPIRATION RATE: 20 BRPM

## 2024-09-03 PROCEDURE — G0299 HHS/HOSPICE OF RN EA 15 MIN: HCPCS

## 2024-09-04 ENCOUNTER — HOME CARE VISIT (OUTPATIENT)
Dept: HOME HOSPICE | Facility: HOSPICE | Age: 89
End: 2024-09-04
Payer: MEDICARE

## 2024-09-04 ENCOUNTER — HOME CARE VISIT (OUTPATIENT)
Dept: HOME HEALTH SERVICES | Facility: HOME HEALTHCARE | Age: 89
End: 2024-09-04
Payer: MEDICARE

## 2024-09-04 PROCEDURE — G0156 HHCP-SVS OF AIDE,EA 15 MIN: HCPCS

## 2024-09-05 ENCOUNTER — HOME CARE VISIT (OUTPATIENT)
Dept: HOME HEALTH SERVICES | Facility: HOME HEALTHCARE | Age: 89
End: 2024-09-05
Payer: MEDICARE

## 2024-09-05 VITALS — HEART RATE: 112 BPM | DIASTOLIC BLOOD PRESSURE: 100 MMHG | RESPIRATION RATE: 19 BRPM | SYSTOLIC BLOOD PRESSURE: 178 MMHG

## 2024-09-05 PROCEDURE — G0299 HHS/HOSPICE OF RN EA 15 MIN: HCPCS

## 2024-09-06 ENCOUNTER — HOME CARE VISIT (OUTPATIENT)
Dept: HOME HEALTH SERVICES | Facility: HOME HEALTHCARE | Age: 89
End: 2024-09-06
Payer: MEDICARE

## 2024-09-06 ENCOUNTER — HOME CARE VISIT (OUTPATIENT)
Dept: HOME HOSPICE | Facility: HOSPICE | Age: 89
End: 2024-09-06
Payer: MEDICARE

## 2024-09-06 PROCEDURE — G0156 HHCP-SVS OF AIDE,EA 15 MIN: HCPCS

## 2024-09-07 ENCOUNTER — HOME CARE VISIT (OUTPATIENT)
Dept: HOME HOSPICE | Facility: HOSPICE | Age: 89
End: 2024-09-07
Payer: MEDICARE

## 2024-09-09 ENCOUNTER — HOME CARE VISIT (OUTPATIENT)
Dept: HOME HEALTH SERVICES | Facility: HOME HEALTHCARE | Age: 89
End: 2024-09-09
Payer: MEDICARE

## 2024-09-09 VITALS — HEART RATE: 88 BPM | SYSTOLIC BLOOD PRESSURE: 160 MMHG | RESPIRATION RATE: 20 BRPM | DIASTOLIC BLOOD PRESSURE: 80 MMHG

## 2024-09-09 PROCEDURE — G0299 HHS/HOSPICE OF RN EA 15 MIN: HCPCS

## 2024-09-10 ENCOUNTER — HOME CARE VISIT (OUTPATIENT)
Dept: HOME HOSPICE | Facility: HOSPICE | Age: 89
End: 2024-09-10
Payer: MEDICARE

## 2024-09-11 ENCOUNTER — HOME CARE VISIT (OUTPATIENT)
Dept: HOME HOSPICE | Facility: HOSPICE | Age: 89
End: 2024-09-11
Payer: MEDICARE

## 2024-09-12 ENCOUNTER — HOME CARE VISIT (OUTPATIENT)
Dept: HOME HOSPICE | Facility: HOSPICE | Age: 89
End: 2024-09-12
Payer: MEDICARE

## 2024-09-13 ENCOUNTER — HOME CARE VISIT (OUTPATIENT)
Dept: HOME HOSPICE | Facility: HOSPICE | Age: 89
End: 2024-09-13
Payer: MEDICARE

## 2024-09-15 ENCOUNTER — HOME CARE VISIT (OUTPATIENT)
Dept: HOME HOSPICE | Facility: HOSPICE | Age: 89
End: 2024-09-15
Payer: MEDICARE

## 2024-09-16 ENCOUNTER — HOME CARE VISIT (OUTPATIENT)
Dept: HOME HEALTH SERVICES | Facility: HOME HEALTHCARE | Age: 89
End: 2024-09-16
Payer: MEDICARE

## 2024-09-16 VITALS
HEART RATE: 100 BPM | SYSTOLIC BLOOD PRESSURE: 160 MMHG | RESPIRATION RATE: 18 BRPM | TEMPERATURE: 98 F | DIASTOLIC BLOOD PRESSURE: 80 MMHG

## 2024-09-16 PROCEDURE — G0299 HHS/HOSPICE OF RN EA 15 MIN: HCPCS

## 2024-09-16 RX ADMIN — Medication 10 MG: at 12:00

## 2024-09-18 ENCOUNTER — HOME CARE VISIT (OUTPATIENT)
Dept: HOME HOSPICE | Facility: HOSPICE | Age: 89
End: 2024-09-18
Payer: MEDICARE

## 2024-09-20 ENCOUNTER — HOME CARE VISIT (OUTPATIENT)
Dept: HOME HOSPICE | Facility: HOSPICE | Age: 89
End: 2024-09-20
Payer: MEDICARE

## 2024-09-21 ENCOUNTER — HOME CARE VISIT (OUTPATIENT)
Dept: HOME HOSPICE | Facility: HOSPICE | Age: 89
End: 2024-09-21
Payer: MEDICARE

## 2024-09-23 ENCOUNTER — HOME CARE VISIT (OUTPATIENT)
Dept: HOME HEALTH SERVICES | Facility: HOME HEALTHCARE | Age: 89
End: 2024-09-23
Payer: MEDICARE

## 2024-09-23 VITALS — HEART RATE: 104 BPM | SYSTOLIC BLOOD PRESSURE: 148 MMHG | RESPIRATION RATE: 22 BRPM | DIASTOLIC BLOOD PRESSURE: 100 MMHG

## 2024-09-23 PROCEDURE — G0299 HHS/HOSPICE OF RN EA 15 MIN: HCPCS

## 2024-09-26 ENCOUNTER — HOME CARE VISIT (OUTPATIENT)
Dept: HOME HEALTH SERVICES | Facility: HOME HEALTHCARE | Age: 89
End: 2024-09-26
Payer: MEDICARE

## 2024-09-26 PROCEDURE — G0299 HHS/HOSPICE OF RN EA 15 MIN: HCPCS

## 2024-09-27 ENCOUNTER — HOME CARE VISIT (OUTPATIENT)
Dept: HOME HEALTH SERVICES | Facility: HOME HEALTHCARE | Age: 89
End: 2024-09-27
Payer: MEDICARE

## 2024-09-27 PROCEDURE — G0156 HHCP-SVS OF AIDE,EA 15 MIN: HCPCS

## 2024-09-30 ENCOUNTER — HOME CARE VISIT (OUTPATIENT)
Dept: HOME HEALTH SERVICES | Facility: HOME HEALTHCARE | Age: 89
End: 2024-09-30
Payer: MEDICARE

## 2024-09-30 PROCEDURE — G0299 HHS/HOSPICE OF RN EA 15 MIN: HCPCS

## 2024-10-01 ENCOUNTER — HOME CARE VISIT (OUTPATIENT)
Dept: HOME HOSPICE | Facility: HOSPICE | Age: 89
End: 2024-10-01
Payer: MEDICARE

## 2024-10-02 ENCOUNTER — HOME CARE VISIT (OUTPATIENT)
Dept: HOME HOSPICE | Facility: HOSPICE | Age: 89
End: 2024-10-02
Payer: MEDICARE

## 2024-10-07 ENCOUNTER — HOME CARE VISIT (OUTPATIENT)
Dept: HOME HOSPICE | Facility: HOSPICE | Age: 89
End: 2024-10-07
Payer: MEDICARE

## 2024-10-07 ENCOUNTER — HOME CARE VISIT (OUTPATIENT)
Dept: HOME HEALTH SERVICES | Facility: HOME HEALTHCARE | Age: 89
End: 2024-10-07
Payer: MEDICARE

## 2024-10-07 PROCEDURE — G0299 HHS/HOSPICE OF RN EA 15 MIN: HCPCS

## 2024-10-10 ENCOUNTER — HOME CARE VISIT (OUTPATIENT)
Dept: HOME HEALTH SERVICES | Facility: HOME HEALTHCARE | Age: 89
End: 2024-10-10
Payer: MEDICARE

## 2024-10-10 PROCEDURE — G0299 HHS/HOSPICE OF RN EA 15 MIN: HCPCS

## 2024-10-11 ENCOUNTER — HOME CARE VISIT (OUTPATIENT)
Dept: HOME HEALTH SERVICES | Facility: HOME HEALTHCARE | Age: 89
End: 2024-10-11
Payer: MEDICARE

## 2024-10-11 PROCEDURE — G0156 HHCP-SVS OF AIDE,EA 15 MIN: HCPCS

## 2024-10-14 ENCOUNTER — HOME CARE VISIT (OUTPATIENT)
Dept: HOME HOSPICE | Facility: HOSPICE | Age: 89
End: 2024-10-14
Payer: MEDICARE

## 2024-10-14 ENCOUNTER — HOME CARE VISIT (OUTPATIENT)
Dept: HOME HEALTH SERVICES | Facility: HOME HEALTHCARE | Age: 89
End: 2024-10-14
Payer: MEDICARE

## 2024-10-14 PROCEDURE — G0299 HHS/HOSPICE OF RN EA 15 MIN: HCPCS

## 2024-10-15 ENCOUNTER — HOME CARE VISIT (OUTPATIENT)
Dept: HOME HOSPICE | Facility: HOSPICE | Age: 89
End: 2024-10-15
Payer: MEDICARE

## 2024-10-16 ENCOUNTER — DOCUMENTATION (OUTPATIENT)
Dept: PALLIATIVE MEDICINE | Facility: HOSPITAL | Age: 89
End: 2024-10-16

## 2024-10-16 ENCOUNTER — HOME CARE VISIT (OUTPATIENT)
Dept: HOME HOSPICE | Facility: HOSPICE | Age: 89
End: 2024-10-16
Payer: MEDICARE

## 2024-10-16 NOTE — PROGRESS NOTES
Hospice diagnosis: Traumatic hemorrhage of left cerebrum without loss of consciousness, subsequent encounter [S06.350D (ICD-10-CM)] [V58.89, 853.01 (ICD-9-CM)     Patient to transition to [place] for home hospice in son's home.     Discharge planned for 10/17 on which day patient is to be taken to [place].     [facility] to [facility] patient on 10/18

## 2024-10-17 ENCOUNTER — HOME CARE VISIT (OUTPATIENT)
Dept: HOME HEALTH SERVICES | Facility: HOME HEALTHCARE | Age: 89
End: 2024-10-17
Payer: MEDICARE

## 2024-10-17 PROCEDURE — G0299 HHS/HOSPICE OF RN EA 15 MIN: HCPCS
